# Patient Record
Sex: MALE | Race: OTHER | Employment: OTHER | URBAN - METROPOLITAN AREA
[De-identification: names, ages, dates, MRNs, and addresses within clinical notes are randomized per-mention and may not be internally consistent; named-entity substitution may affect disease eponyms.]

---

## 2019-10-29 ENCOUNTER — HOSPITAL ENCOUNTER (INPATIENT)
Facility: HOSPITAL | Age: 79
LOS: 9 days | Discharge: HOME HEALTH | DRG: 233 | End: 2019-11-08
Attending: EMERGENCY MEDICINE | Admitting: HOSPITALIST
Payer: MEDICARE

## 2019-10-29 ENCOUNTER — APPOINTMENT (OUTPATIENT)
Dept: CT IMAGING | Facility: HOSPITAL | Age: 79
DRG: 233 | End: 2019-10-29
Attending: EMERGENCY MEDICINE
Payer: MEDICARE

## 2019-10-29 DIAGNOSIS — D72.829 LEUKOCYTOSIS, UNSPECIFIED TYPE: ICD-10-CM

## 2019-10-29 DIAGNOSIS — R10.9 ABDOMINAL PAIN OF UNKNOWN ETIOLOGY: ICD-10-CM

## 2019-10-29 DIAGNOSIS — N13.30 HYDRONEPHROSIS OF RIGHT KIDNEY: ICD-10-CM

## 2019-10-29 DIAGNOSIS — R77.8 ELEVATED TROPONIN: ICD-10-CM

## 2019-10-29 DIAGNOSIS — I50.9 ACUTE ON CHRONIC CONGESTIVE HEART FAILURE, UNSPECIFIED HEART FAILURE TYPE (HCC): Primary | ICD-10-CM

## 2019-10-29 DIAGNOSIS — J18.9 COMMUNITY ACQUIRED PNEUMONIA, UNSPECIFIED LATERALITY: ICD-10-CM

## 2019-10-29 DIAGNOSIS — G47.33 OSA (OBSTRUCTIVE SLEEP APNEA): ICD-10-CM

## 2019-10-29 DIAGNOSIS — R94.31 ABNORMAL EKG: ICD-10-CM

## 2019-10-29 DIAGNOSIS — R07.9 CHEST PAIN, UNSPECIFIED TYPE: ICD-10-CM

## 2019-10-29 PROCEDURE — 74176 CT ABD & PELVIS W/O CONTRAST: CPT | Performed by: EMERGENCY MEDICINE

## 2019-10-29 RX ORDER — SODIUM CHLORIDE 9 MG/ML
1000 INJECTION, SOLUTION INTRAVENOUS ONCE
Status: DISCONTINUED | OUTPATIENT
Start: 2019-10-29 | End: 2019-10-29

## 2019-10-29 RX ORDER — PANTOPRAZOLE SODIUM 40 MG/1
40 TABLET, DELAYED RELEASE ORAL
COMMUNITY
End: 2019-11-13 | Stop reason: CLARIF

## 2019-10-29 RX ORDER — MORPHINE SULFATE 4 MG/ML
4 INJECTION, SOLUTION INTRAMUSCULAR; INTRAVENOUS ONCE
Status: COMPLETED | OUTPATIENT
Start: 2019-10-29 | End: 2019-10-29

## 2019-10-29 RX ORDER — ONDANSETRON 2 MG/ML
4 INJECTION INTRAMUSCULAR; INTRAVENOUS ONCE
Status: COMPLETED | OUTPATIENT
Start: 2019-10-29 | End: 2019-10-29

## 2019-10-30 ENCOUNTER — APPOINTMENT (OUTPATIENT)
Dept: CT IMAGING | Facility: HOSPITAL | Age: 79
DRG: 233 | End: 2019-10-30
Attending: EMERGENCY MEDICINE
Payer: MEDICARE

## 2019-10-30 ENCOUNTER — APPOINTMENT (OUTPATIENT)
Dept: INTERVENTIONAL RADIOLOGY/VASCULAR | Facility: HOSPITAL | Age: 79
DRG: 233 | End: 2019-10-30
Attending: INTERNAL MEDICINE
Payer: MEDICARE

## 2019-10-30 ENCOUNTER — APPOINTMENT (OUTPATIENT)
Dept: CV DIAGNOSTICS | Facility: HOSPITAL | Age: 79
DRG: 233 | End: 2019-10-30
Attending: INTERNAL MEDICINE
Payer: MEDICARE

## 2019-10-30 PROBLEM — I50.9 ACUTE ON CHRONIC CONGESTIVE HEART FAILURE, UNSPECIFIED HEART FAILURE TYPE (HCC): Status: ACTIVE | Noted: 2019-10-30

## 2019-10-30 PROBLEM — J18.9 COMMUNITY ACQUIRED PNEUMONIA, UNSPECIFIED LATERALITY: Status: ACTIVE | Noted: 2019-10-30

## 2019-10-30 PROBLEM — N13.30 HYDRONEPHROSIS OF RIGHT KIDNEY: Status: ACTIVE | Noted: 2019-10-30

## 2019-10-30 PROBLEM — R77.8 ELEVATED TROPONIN: Status: ACTIVE | Noted: 2019-10-30

## 2019-10-30 PROBLEM — D72.829 LEUKOCYTOSIS, UNSPECIFIED TYPE: Status: ACTIVE | Noted: 2019-10-30

## 2019-10-30 PROBLEM — R10.9 ABDOMINAL PAIN OF UNKNOWN ETIOLOGY: Status: ACTIVE | Noted: 2019-10-30

## 2019-10-30 PROBLEM — R79.89 ELEVATED TROPONIN: Status: ACTIVE | Noted: 2019-10-30

## 2019-10-30 PROBLEM — R94.31 ABNORMAL EKG: Status: ACTIVE | Noted: 2019-10-30

## 2019-10-30 PROBLEM — I50.9 ACUTE ON CHRONIC CONGESTIVE HEART FAILURE (HCC): Status: ACTIVE | Noted: 2019-10-30

## 2019-10-30 PROCEDURE — 99223 1ST HOSP IP/OBS HIGH 75: CPT | Performed by: INTERNAL MEDICINE

## 2019-10-30 PROCEDURE — 71275 CT ANGIOGRAPHY CHEST: CPT | Performed by: EMERGENCY MEDICINE

## 2019-10-30 PROCEDURE — 93306 TTE W/DOPPLER COMPLETE: CPT | Performed by: INTERNAL MEDICINE

## 2019-10-30 PROCEDURE — 74177 CT ABD & PELVIS W/CONTRAST: CPT | Performed by: EMERGENCY MEDICINE

## 2019-10-30 PROCEDURE — 99233 SBSQ HOSP IP/OBS HIGH 50: CPT | Performed by: INTERNAL MEDICINE

## 2019-10-30 PROCEDURE — 93460 R&L HRT ART/VENTRICLE ANGIO: CPT | Performed by: INTERNAL MEDICINE

## 2019-10-30 PROCEDURE — 99152 MOD SED SAME PHYS/QHP 5/>YRS: CPT | Performed by: INTERNAL MEDICINE

## 2019-10-30 PROCEDURE — B2111ZZ FLUOROSCOPY OF MULTIPLE CORONARY ARTERIES USING LOW OSMOLAR CONTRAST: ICD-10-PCS | Performed by: INTERNAL MEDICINE

## 2019-10-30 PROCEDURE — B2161ZZ FLUOROSCOPY OF RIGHT AND LEFT HEART USING LOW OSMOLAR CONTRAST: ICD-10-PCS | Performed by: INTERNAL MEDICINE

## 2019-10-30 PROCEDURE — 4A023N8 MEASUREMENT OF CARDIAC SAMPLING AND PRESSURE, BILATERAL, PERCUTANEOUS APPROACH: ICD-10-PCS | Performed by: INTERNAL MEDICINE

## 2019-10-30 RX ORDER — LISINOPRIL 10 MG/1
10 TABLET ORAL ONCE
Status: COMPLETED | OUTPATIENT
Start: 2019-10-30 | End: 2019-10-30

## 2019-10-30 RX ORDER — SODIUM CHLORIDE 9 MG/ML
INJECTION, SOLUTION INTRAVENOUS CONTINUOUS
Status: ACTIVE | OUTPATIENT
Start: 2019-10-30 | End: 2019-10-30

## 2019-10-30 RX ORDER — LISINOPRIL 10 MG/1
10 TABLET ORAL 2 TIMES DAILY
Status: DISCONTINUED | OUTPATIENT
Start: 2019-10-30 | End: 2019-11-04

## 2019-10-30 RX ORDER — POTASSIUM CHLORIDE 20 MEQ/1
40 TABLET, EXTENDED RELEASE ORAL EVERY 4 HOURS
Status: DISPENSED | OUTPATIENT
Start: 2019-10-30 | End: 2019-10-30

## 2019-10-30 RX ORDER — ACETAMINOPHEN 325 MG/1
650 TABLET ORAL EVERY 4 HOURS PRN
Status: DISCONTINUED | OUTPATIENT
Start: 2019-10-30 | End: 2019-11-04

## 2019-10-30 RX ORDER — NICOTINE 21 MG/24HR
1 PATCH, TRANSDERMAL 24 HOURS TRANSDERMAL DAILY
Status: DISCONTINUED | OUTPATIENT
Start: 2019-10-30 | End: 2019-11-08

## 2019-10-30 RX ORDER — HEPARIN SODIUM AND DEXTROSE 10000; 5 [USP'U]/100ML; G/100ML
12 INJECTION INTRAVENOUS ONCE
Status: COMPLETED | OUTPATIENT
Start: 2019-10-30 | End: 2019-10-30

## 2019-10-30 RX ORDER — FUROSEMIDE 10 MG/ML
40 INJECTION INTRAMUSCULAR; INTRAVENOUS
Status: DISCONTINUED | OUTPATIENT
Start: 2019-10-30 | End: 2019-10-30

## 2019-10-30 RX ORDER — ASPIRIN 81 MG/1
324 TABLET, CHEWABLE ORAL ONCE
Status: COMPLETED | OUTPATIENT
Start: 2019-10-30 | End: 2019-10-30

## 2019-10-30 RX ORDER — LIDOCAINE HYDROCHLORIDE 10 MG/ML
INJECTION, SOLUTION EPIDURAL; INFILTRATION; INTRACAUDAL; PERINEURAL
Status: COMPLETED
Start: 2019-10-30 | End: 2019-10-30

## 2019-10-30 RX ORDER — HEPARIN SODIUM 5000 [USP'U]/ML
30 INJECTION INTRAVENOUS; SUBCUTANEOUS ONCE
Status: DISCONTINUED | OUTPATIENT
Start: 2019-10-30 | End: 2019-11-01

## 2019-10-30 RX ORDER — HEPARIN SODIUM 5000 [USP'U]/ML
60 INJECTION INTRAVENOUS; SUBCUTANEOUS ONCE
Status: COMPLETED | OUTPATIENT
Start: 2019-10-30 | End: 2019-10-30

## 2019-10-30 RX ORDER — MIDAZOLAM HYDROCHLORIDE 1 MG/ML
INJECTION INTRAMUSCULAR; INTRAVENOUS
Status: COMPLETED
Start: 2019-10-30 | End: 2019-10-30

## 2019-10-30 RX ORDER — FUROSEMIDE 10 MG/ML
40 INJECTION INTRAMUSCULAR; INTRAVENOUS ONCE
Status: COMPLETED | OUTPATIENT
Start: 2019-10-30 | End: 2019-10-30

## 2019-10-30 RX ORDER — HEPARIN SODIUM AND DEXTROSE 10000; 5 [USP'U]/100ML; G/100ML
INJECTION INTRAVENOUS CONTINUOUS
Status: DISCONTINUED | OUTPATIENT
Start: 2019-10-30 | End: 2019-11-01

## 2019-10-30 RX ORDER — ASPIRIN 81 MG/1
324 TABLET, CHEWABLE ORAL NIGHTLY
Status: DISCONTINUED | OUTPATIENT
Start: 2019-10-30 | End: 2019-11-04

## 2019-10-30 RX ORDER — ACETAMINOPHEN AND CODEINE PHOSPHATE 300; 30 MG/1; MG/1
1 TABLET ORAL EVERY 4 HOURS PRN
Status: DISCONTINUED | OUTPATIENT
Start: 2019-10-30 | End: 2019-11-04

## 2019-10-30 RX ORDER — ACETAMINOPHEN AND CODEINE PHOSPHATE 300; 30 MG/1; MG/1
2 TABLET ORAL EVERY 4 HOURS PRN
Status: DISCONTINUED | OUTPATIENT
Start: 2019-10-30 | End: 2019-11-04

## 2019-10-30 RX ORDER — HEPARIN SODIUM 5000 [USP'U]/ML
INJECTION, SOLUTION INTRAVENOUS; SUBCUTANEOUS
Status: COMPLETED
Start: 2019-10-30 | End: 2019-10-30

## 2019-10-30 RX ORDER — NITROGLYCERIN 20 MG/100ML
INJECTION INTRAVENOUS CONTINUOUS
Status: DISCONTINUED | OUTPATIENT
Start: 2019-10-30 | End: 2019-10-31

## 2019-10-30 RX ORDER — HYDRALAZINE HYDROCHLORIDE 20 MG/ML
10 INJECTION INTRAMUSCULAR; INTRAVENOUS
Status: DISCONTINUED | OUTPATIENT
Start: 2019-10-30 | End: 2019-11-08

## 2019-10-30 RX ORDER — ATORVASTATIN CALCIUM 40 MG/1
40 TABLET, FILM COATED ORAL NIGHTLY
Status: DISCONTINUED | OUTPATIENT
Start: 2019-10-30 | End: 2019-11-08

## 2019-10-30 NOTE — CONSULTS
Cardiothoracic Surgery  Consult       Referring: Dr Prudenico Camacho  PCP: Dr Kassy Mantilla  Reason for consult:  70year old admitted with abdominal pain, had recent EGD demo gastritis and small hiatal hernia,   also had recent SOB and chest tightness  Pt admits to hav inferior, and basal-mid anterolateral myocardium. 3. Aortic valve: Trileaflet; normal thickness leaflets. Transvalvular     velocity was within the normal range. There was no stenosis. Trivial     regurgitation.   4. Aorta: Ascending aorta diameter was dil neck or groin  Lungs: Clear bilaterally, normal respiratory effort                                            CV: RRR   Abdomen: soft, non-tender, non-distended, no hepatomegaly or splenomegaly  Ext: no edema, no venous varicosities  Skin: no erythema, umair represent infarct or   infection such as pyelonephritis. This also limits sensitivity for an underlying lesion. A hypodense lesion of the left hepatic lobe measures 9 mm in diameter and is incompletely characterized.   It is likely too small for accurate family and questions answered   Plan: ck carotids, pulmonary and ID consult, may need  consult, CABG on these issues resolved

## 2019-10-30 NOTE — H&P
[facility] HOSPITAL[facility]  History and Physical     Formerly [facility] Patient Status:  Inpatient    1940 MRN [id number]   [facility] 2NE-A Attending Inocente Homans, MD   Hosp Day # 0 PCP PHYSICIAN NONSTAFF     Chief Complaint: Abdominal Pain No rhonchi. Cardiovascular: S1, S2. Regular rate and rhythm. No murmurs, rubs or gallops. Equal pulses. Chest and Back: No tenderness or deformity. Abdomen: Soft, mild epigastric tenderness, nondistended. Positive bowel sounds.  No rebound, guarding or after discharge, patient will require TBD.

## 2019-10-30 NOTE — ED INITIAL ASSESSMENT (HPI)
Pt reports abdominal pain and vomiting starting this morning after drinking coffee. Unable to tolerate the pain tonight. Phone  used in triage.

## 2019-10-30 NOTE — CONSULTS
[facility]  Report of Consultation    Robert Sheppard Patient Status:  Inpatient    1940 MRN [id number]   [facility] 2NE-A Attending Sybil Chung MD   Hosp Day # 0 PCP PHYSICIAN NONSTAFF     Reason for Consultation: Abnormal questioning, the patient does have some issues with urinary hesitancy and frequency but denies hematuria or dysuria. Of note, the patient was markedly hypertensive in the emergency room but is never been diagnosed with hypertension.   Earlier today he unde Conjunctivae/corneas clear. No scleral icterus. No conjunctival     hemorrhage. Nose: Nares normal.   Throat: Lips, mucosa, and tongue normal.  No thrush noted. Neck: Soft, supple neck; trachea midline, no adenopathy, no thyromegaly.    Lungs: Clear a regurgitation. 4. Aorta: Ascending aorta diameter was dilated at 4.2cm. Aortic root was     dilated at 4.3cm at the sinus of Valsalva. 5. Mitral valve: Structurally normal valve. Mitral valve demonstrates normal     thickness leaflets.  Transvalvular velo

## 2019-10-30 NOTE — ED PROVIDER NOTES
Patient Seen in: [facility] Emergency Department      History   Patient presents with:  Abdomen/Flank Pain (GI/)    Stated Complaint: abd pain    HPI    74-year-old male presents emergency room with chief complaint of abdominal pain as well as david Current:BP (!) 178/87   Pulse 63   Temp 99.3 °F (37.4 °C) (Oral)   Resp 22   Wt 72.6 kg   SpO2 97%         Physical Exam    GENERAL: Patient is awake, alert, well-appearing, in no acute distress. HEENT: no scleral icterus.   Mucous membranes are mois Status                     ---------                               -----------         ------                     CBC W/ DIFFERENTIAL[[id number]]          Abnormal            Final result                 Please view results for these tests on the individual pelvis:  Heterogeneous enhancement of the right kidney with multifocal areas of decreased enhancement, concerning for either pyelonephritis or multiple infarcts. 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   No significant jocelyn Urinalysis not consistent with pyelonephritis and clinical exam not consistent as patient has no CVA tenderness. Etiology of the decreased enhancement of kidneys is unclear.   Patient did have blood cultures performed and was given IV antibiotic for pneumo

## 2019-10-30 NOTE — PLAN OF CARE
Patient resting comfortably in bed, wife and son at bedside. Burundian speaking only. Aox4. Oxygenating 91% on 2L NC. NSR/Sinus alfred on tele. VSS. Nitro gtt at 10 mcgs/min, Heparin gtt a 9 mls/hr.  Complains of some SOB at rest and pain mild abd pain rated 2 measures for life threatening arrhythmias  - Monitor electrolytes and administer replacement therapy as ordered  Outcome: Progressing

## 2019-10-30 NOTE — PROCEDURES
Procedure dictated.     RHC  Saturations  C  LV-gram    Indications:  -Hypertensive crisis with presenting blood pressure up to 200 mmHg  -Chest heaviness and shortness of breath over last few weeks  -Abnormal EKG  -Elevated troponin at 0.117  -Active smo medications  -Right leg straight for 4 hours  -DC IV Lasix -mild CHF was tuneup based on right heart catheterization today  -Hypoxia with history of active smoking with no official diagnosis of COPD  -Recommend outpatient sleep apnea study based on tee

## 2019-10-30 NOTE — PLAN OF CARE
10/30/2019    Pt A&Ox4, 2L NC- no home O2. Lungs congested at the bases. NSR on tele. Macedonian speaking. Admission process complete. Answered all questions and met all pt needs. Son and wife at bedside. Pt continent with urinal at bedside.  Resting at this t

## 2019-10-31 ENCOUNTER — APPOINTMENT (OUTPATIENT)
Dept: GENERAL RADIOLOGY | Facility: HOSPITAL | Age: 79
DRG: 233 | End: 2019-10-31
Attending: INTERNAL MEDICINE
Payer: MEDICARE

## 2019-10-31 PROCEDURE — 99233 SBSQ HOSP IP/OBS HIGH 50: CPT | Performed by: INTERNAL MEDICINE

## 2019-10-31 PROCEDURE — 99232 SBSQ HOSP IP/OBS MODERATE 35: CPT | Performed by: HOSPITALIST

## 2019-10-31 PROCEDURE — 71048 X-RAY EXAM CHEST 4+ VIEWS: CPT | Performed by: INTERNAL MEDICINE

## 2019-10-31 NOTE — PROGRESS NOTES
Pt. S/P heart cath , right groin soft , denies any pain or discomfort. Post procedura instructions provided. Pt. Austrian speaking only , family at bedside to interpret. Tele shows SR on the monitor. On O2 at 2 li/min via nc m O2 sat at > 90's.  Jalen De Anda

## 2019-10-31 NOTE — CONSULTS
[facility] Urology   Consultation Note    Beltran Woodard Patient Status:  Inpatient    1940 MRN [id number]   [place] 2NE-A Attending Shima Bates MD   Hosp Day # 1 PCP PHYSICIAN NONSTAFF     Reason for Consultation: infusion 50mg in D5W 250ml, 5-400 mcg/min, Intravenous, Continuous  •  CefTRIAXone Sodium (ROCEPHIN) 1 g in sodium chloride 0.9% 100 mL MBP/ADD-vantage, 1 g, Intravenous, Q24H  •  azithromycin (ZITHROMAX) 500 mg in sodium chloride 0.9% 250 mL IVPB, 500 mg,  10/31/2019    CO2 27.0 10/31/2019     10/31/2019    CA 8.6 10/31/2019    PTT 44.6 10/31/2019    ESRML 45 10/31/2019    CRP 11.80 10/31/2019    MG 2.1 10/31/2019    PGLU 144 10/31/2019     WBC 14.3-->14.8-->17.4    UA 10/30/19: 5-10 WBC/hpf pulmonary embolism. Heart size is within normal limits.   Small to moderate right and small left pleural effusions appear slightly increased from the   prior exam.     Central airways demonstrate patchy areas of airspace opacity concerning for multifocal p multifocal pneumonia. Edema is possible but felt to be less likely. There is peribronchial thickening as well. Follow-up is recommended to ensure resolution. Limited evaluation for pulmonary embolism, however no pulmonary embolism is seen.      Media AM

## 2019-10-31 NOTE — CONSULTS
INFECTIOUS DISEASE CONSULT NOTE    Oralee Winnsboro Patient Status:  Inpatient    1940 MRN [id number]   [facility] 2NE-A Attending Jayla Henriquez MD   Hosp Day # 1 PCP PHYSICIAN drugs.     Allergies:  No Known Allergies    Medications:    Current Facility-Administered Medications:   •  nitroGLYCERIN infusion 50mg in D5W 250ml, 5-400 mcg/min, Intravenous, Continuous  •  CefTRIAXone Sodium (ROCEPHIN) 1 g in sodium chloride 0.9% 100 m arthritis. Neurological: Negative for headaches, dizziness, focal neuro deficits  Behavioral/Psych: Negative for anxiety or depression    Physical Exam:    General: No acute distress. Alert and oriented x 3.   Vital signs: Blood pressure 129/54, pulse 59, Oral)(cpt=74176)    Result Date: 10/29/2019  PROCEDURE:  CT ABDOMEN/PELVIS KIDNEYSTONE 2D RNDR (NO IV,NO ORAL) (CPT=74176)  COMPARISON:  None.   INDICATIONS:  abd pain  TECHNIQUE:  Unenhanced multislice CT scanning from above the kidneys to below the urinar system may be due to mild chronic right UPJ obstruction.     Dictated by: Fatimah Rosen MD on 10/29/2019 at 23:20     Approved by: Fatimah Rosen MD on 10/29/2019 at 23:25          Cta Chest + Ct Abd (w) + Ct Pel (w) (cpt=71275/32374)    Result D suggested. Incidental note is made of aneurysmal dilatation of the mid ascending thoracic aorta measuring up to 4.2 cm in diameter. ABDOMEN/PELVIS:  Motion artifact also slightly limits evaluation of the abdomen and pelvis.   Liver contains a tiny hypoden provided by [facility].       Dictated by: Homer Hoffman MD on 10/30/2019 at 7:25     Approved by: Homer Hoffman MD on 10/30/2019 at 7:34           ASSESSMENT/ PLAN:    1. abnl CXR with bilat infiltrates- chronic presentation, symptoms going on for over 1 m

## 2019-10-31 NOTE — PROGRESS NOTES
[facility]ITAL[facility]  Progress Note     Robert Sheppard Patient Status:  Inpatient    1940 MRN [id number]   [facility] 2NE-A Attending Sybil Chung MD   Hosp Day # 1 PCP PHYSICIAN NONSTAFF     Chief Complaint: abd pain, SOB  CAD fo 1. New onset Acute CHF  Systolic   1. ECHO  LVEF 40-45%  + WMA   2. Diuresis  3. I/O's  4. Cardiology following  2. CAD -  S/p LHC  1. CAD multi vessel  RCA/ Cx   2. CVS consult  Poss CABG Mon if pulm issues resolve   3. Heparin gtt  4.  CABG vs  pl MD [facility]  Internal Medicine Hospitalist  Pager [phone]  Cell [phone]

## 2019-10-31 NOTE — DIETARY NOTE
[facility]    NUTRITION INITIAL ASSESSMENT    Pt does not meet malnutrition criteria.     NUTRITION DIAGNOSIS/PROBLEM:    Possible food and nutrition-related knowledge deficit related to possible lack of previous diet ed as evidenced by consult for HF Addresses: Yes    NUTRITION RELATED PHYSICAL FINDINGS: unable to complete, pt not in room.     NUTRITION PRESCRIPTION:  Calories: 2544-6924 calories/day (23-25 calories per kg)  Protein: 72-90 grams protein/day (1.0-1.25 grams protein per kg)  Fluid: ~1 ml/

## 2019-10-31 NOTE — PLAN OF CARE
Dr. Mikey Mccoy to  see patient; ct abdomen result of right hydronephrosis  Accurate I/o  Info re iv antibiotics given to patient and printed materials re pneumonia

## 2019-10-31 NOTE — PROGRESS NOTES
[facility] Cardiology  Progress Note    Florencia Floyd Patient Status:  Inpatient    1940 MRN [id number]   [facility] 2NE-A Attending Charis Galindo MD   Hosp Day # 1 PCP PHYSICIAN NONSTAFF     Subjective: Chest sympto skin. No rashes. Psychiatric: no depression. Normal affect.      Laboratory/Data:    Labs:  Lab Results   Component Value Date    ESRML 45 10/31/2019    CRP 11.80 10/31/2019       Recent Labs   Lab 10/29/19  2212 10/30/19  0609 10/31/19  0740   WBC 14.3* 1 enlargement or focal lesion. AORTA/VASCULAR:  Vascular calcifications are noted. RETROPERITONEUM:  No mass or adenopathy. BOWEL/MESENTERY:  The appendix is normal. ABDOMINAL WALL:  No mass or hernia.   BONES:  Bilateral pars interarticularis defects with CONTRAST USED:  100cc of Omnipaque 350  FINDINGS:   CHEST:  Motion artifact somewhat limits evaluation. Within the limitations there is no convincing evidence of pulmonary embolism. Heart size is within normal limits.   Small to moderate right and small l decompressed. No lymphadenopathy is seen. CONCLUSION:  Patchy airspace opacities of the lungs favored represent multifocal pneumonia. Edema is possible but felt to be less likely. There is peribronchial thickening as well.   Follow-up is recommende PRN    Or  Acetaminophen-Codeine #3 (TYLENOL #3) 300-30 MG tab 2 tablet, 2 tablet, Oral, Q4H PRN  nicotine (NICODERM CQ) 14 MG/24HR 1 patch, 1 patch, Transdermal, Daily        Allergies:  No Known Allergies    Impression:  1.      Hypertensive crisis with p protein 11.8. Leukocytosis with left shift and neutrophils. Kidney infection not excluded. 8.    Right flank pain -mild right hydronephrosis possible due to right UPJ obstruction.   Right kidney wedged shaped areas of hypoattenuation throughout the par

## 2019-10-31 NOTE — PROGRESS NOTES
[facility]  Progress Note    Rosi Diaz Patient Status:  Inpatient    1940 MRN [id number]   [facility] 2NE-A Attending Lena Irene MD   Hosp Day # 1 PCP PHYSICIAN NONSTAFF     Subjective:  Rosi Diaz is a 70 year o 24.0 28.0  --  27.0   ALKPHO 109  --   --   --    AST 62*  --   --   --    ALT 52  --   --   --    BILT 0.7  --   --   --    TP 8.0  --   --   --        Recent Labs   Lab 10/31/19  0740   MG 2.1       No results found for: PHOS, PHOSPHORUS     Recent Labs

## 2019-10-31 NOTE — PLAN OF CARE
Problem: Patient/Family Goals  Goal: Patient/Family Long Term Goal  Description  Patient's Long Term Goal: to stay out of the hospital    Interventions:  - take medications as prescribed  -adhere to plan of care  -adhere to appropriate dietary restrictio

## 2019-10-31 NOTE — CONSULTS
All pertinent imaging, labs, notes and history reviewed by Dr. Frank Powers. Although we agree that pulmonary findings are more consistent with CHF, the continued leukocytosis is concerning and should be resolved prior to surgery.  We will follow over the weekend an

## 2019-10-31 NOTE — PROCEDURES
[facility]    PATIENT'S NAME: Nikita Shankar   ATTENDING PHYSICIAN: Rizwan Guevara M.D. OPERATING PHYSICIAN: Lit Campoverde M.D.    PATIENT ACCOUNT#:   [de-identified]    LOCATION:  [address] [place] [address]  MEDICAL RECORD #:   [id number]       DATE OF REMBERTO catheterization was performed using pulmonary capillary wedge pressure catheter. We measured right heart pressures and calculated cardiac output using a Jaqui equation on room air.     Selective coronary angiography was performed using 6-Korean FR4 and FL4 no disease angiographically. LAD artery had mild disease in proximal segment; otherwise, no stenosis angiographically although a very tortuous vessel due to history of uncontrolled hypertension.   Major diagonal branches had no disease angiographically but segments. 9. No MR angiographically. 10.   Mildly to moderately dilated ascending aorta with mildly dilated aortic root.   11.   Right heart catheterization was consistent with mild pulmonary hypertension and normal biventricular filling pressures after

## 2019-10-31 NOTE — PAYOR COMM NOTE
--------------  ADMISSION REVIEW     Payor: [id number] [place] [address] #:  [id number]  Authorization Number: [id number]    Admit date: 10/30/19  Admit time: 700 Kevin       Admitting Physician: Scarlett Carty MD  Attending Physician:  Birgit Regalado MD [employer]                      Social History    Tobacco Use      Smoking status: Current Every Day Smoker        Packs/day: 0.00      Smokeless tobacco: Never Used    Alcohol use: Not on file    Drug use: Not on file             Review of Systems Notable for the following components:    Pro-Beta Natriuretic Peptide 6,623 (*)     All other components within normal limits   CBC W/ DIFFERENTIAL - Abnormal; Notable for the following components:    WBC 14.3 (*)     Neutrophil Absolute Prelim 11.13 (*) heart.  Numerous mildly prominent mediastinal and hilar lymph nodes  Degenerative changes of the spine    Abdomen and pelvis:  Heterogeneous enhancement of the right kidney with multifocal areas of decreased enhancement, concerning for either pyelonephriti the right kidney with multifocal areas of decreased enhancement concerning for pyelonephritis or multiple infarcts. Urinalysis not consistent with pyelonephritis and clinical exam not consistent as patient has no CVA tenderness.   Etiology of the decreased HOSPITALIST  History and Physical     Macy Camacho Patient Status:  Inpatient    1940 MRN [id number]   [facility] 2NE-A Attending Dereje Ugalde MD   Hosp Day # 0 PCP PHYSICIAN NONSTAFF     Chief Complaint: Abdominal Pain    Histor 9. 2   ALB 3.5      K 3.8      CO2 24.0   ALKPHO 109   AST 62*   ALT 52   BILT 0.7   TP 8.0       CrCl cannot be calculated (Unknown ideal weight. ).     Recent Labs   Lab 10/29/19  2212   PTP 13.3   INR 0.97       Recent Labs   Lab 10/29/19  2212 2334 New Bag 500 mg Intravenous Romeleigh anne Mary Lou PARRISH RN      CefTRIAXone Sodium (ROCEPHIN) 1 g in sodium chloride 0.9% 100 mL MBP/ADD-vantage     Date Action Dose Route User    10/30/2019 2332 New Bag 1 g Intravenous Kadi PARRISH RN      heparin (PORCINE Date Action Dose Route User    10/30/2019 1643 New Bag (none) Intravenous Jass Chávez RN      Reason for Consultation: Abnormal chest x-ray, significant coronary artery disease/congestive heart failure, concern for possible pneumonia     History of Pre patient was markedly hypertensive in the emergency room but is never been diagnosed with hypertension.   Earlier today he underwent coronary angiography to investigate his elevated troponin levels and an abnormal echocardiogram that demonstrated a decreased No conjunctival                    hemorrhage. Nose: Nares normal.               Throat: Lips, mucosa, and tongue normal.  No thrush noted. Neck: Soft, supple neck; trachea midline, no adenopathy, no thyromegaly.                L thickness leaflets. Transvalvular     velocity was within the normal range. There was no stenosis. Trivial     regurgitation. 4. Aorta: Ascending aorta diameter was dilated at 4.2cm. Aortic root was     dilated at 4.3cm at the sinus of Valsalva.   [address] lb 15.2 oz None (Room air) — BM   10/30/19 2326 99.8 °F (37.7 °C) 69 18 127/64 89 %Abnormal  — None (Room air) — GC   10/30/19 1957 98.4 °F (36.9 °C) 71 17 129/65 94 % — Nasal cannula 2 L/min RW   10/30/19 1900 — 81 — 146/72 — — — — KD   10/30/19 1802 — 82

## 2019-10-31 NOTE — PLAN OF CARE
Sinus rhythm on tele deny chest pain heparin drip infusing / acs afib protocol  Right groin soft cms is normal    02 sat on room air at rest is 88%    Needs 2-3L 02/ nc to keep o2 sat over 92%    Iv antibiotics wean o2 as tolerated  Increase activity as

## 2019-11-01 PROCEDURE — 99232 SBSQ HOSP IP/OBS MODERATE 35: CPT | Performed by: HOSPITALIST

## 2019-11-01 PROCEDURE — 99233 SBSQ HOSP IP/OBS HIGH 50: CPT | Performed by: INTERNAL MEDICINE

## 2019-11-01 RX ORDER — MORPHINE SULFATE 4 MG/ML
1 INJECTION, SOLUTION INTRAMUSCULAR; INTRAVENOUS EVERY 4 HOURS PRN
Status: DISCONTINUED | OUTPATIENT
Start: 2019-11-01 | End: 2019-11-04

## 2019-11-01 RX ORDER — METOPROLOL TARTRATE 5 MG/5ML
5 INJECTION INTRAVENOUS ONCE
Status: DISCONTINUED | OUTPATIENT
Start: 2019-11-01 | End: 2019-11-01

## 2019-11-01 RX ORDER — FUROSEMIDE 20 MG/1
20 TABLET ORAL DAILY
Status: DISCONTINUED | OUTPATIENT
Start: 2019-11-01 | End: 2019-11-04

## 2019-11-01 RX ORDER — METOPROLOL TARTRATE 5 MG/5ML
2.5 INJECTION INTRAVENOUS ONCE
Status: COMPLETED | OUTPATIENT
Start: 2019-11-01 | End: 2019-11-01

## 2019-11-01 NOTE — PROGRESS NOTES
[facility]PITAL[facility]  Progress Note     Chyrl Ambrosia Patient Status:  Inpatient    1940 MRN [id number]   [facility] 2NE-A Attending Keisha Alvarez MD   Hosp Day # 2 PCP PHYSICIAN NONSTAFF     Chief Complaint: abd pain, SOB  CAD fo 11/01/19  0556   TROP 0.066* 0.117* <0.045        Imaging: Imaging data reviewed in Epic. ASSESSMENT / PLAN:   1. New onset Acute CHF  Systolic   1. ECHO  LVEF 40-45%  + WMA   2. Diuresis as needed per cards   3. I/O's  4. Cardiology following  2.  CAD - breathing pattern  Abdomen: nontender; nondistended  Neuro: nonfocal; moves all limbs; cnii-xii grossly intact     Assessment/Plan:  I have had a face-to-face encounter with this patient and agree with eduar faria's assessment and plan for this patient.

## 2019-11-01 NOTE — PROGRESS NOTES
[facility] Cardiology Progress Note    Subjective:  Resting comfortably without any pain per family at bedside. Per nursing, he declined to walk in hallway, because he feels he is too sick.      Objective:  /50 (BP Location: Left arm)   Pulse 58   Temp chart was reviewed. Hypertensive crisis with presenting blood pressure up to 200 mmHg causing no acute CHF with underlying coronary artery disease -no new diagnoses - hypertension is a new diagnosis.   We initiated two blood pressure medications post car Multivessel coronary artery disease with 100% chronic total occlusion of proximal RCA and chronic total occlusion of proximal left circumflex system. The entire heart is supplied by one vessel LAD artery with no significant disease angiographically.   LAD use Lasix 20 mg daily since he was not fluid overloaded by recent RHC. I like to start low-dose of Lasix for pleural effusion to try to diuresis it. No fluid overload by exam.  Continue initiated aspirin, beta-blocker, lisinopril and statin.   Okay to DC

## 2019-11-01 NOTE — CM/SW NOTE
MSW spoke with [place] from [facility]. They are unable to accept this patient but will re-refer to [facility]. Order and face to face completed.      Lieutenant Elda LCSW

## 2019-11-01 NOTE — PLAN OF CARE
Assumed care of pt. At 1. Pt. Is AxOx4 and on 2 L's of O2, with O2 sat of 94%. Pt. Is Mohawk speaking only. Family at bedside and  ipad in room. Pt. Has diminished bilateral breath sounds. Pt. Has NSR/ sinus alfred with a current HR of 59.  Pt weights  Outcome: Progressing  Goal: Absence of cardiac arrhythmias or at baseline  Description  INTERVENTIONS:  - Continuous cardiac monitoring, monitor vital signs, obtain 12 lead EKG if indicated  - Evaluate effectiveness of antiarrhythmic and heart rat

## 2019-11-01 NOTE — PROGRESS NOTES
[facility]  Progress Note    Chyrl Ambrosia Patient Status:  Inpatient    1940 MRN [id number]   [facility] 2NE-A Attending Keisha Alvarez MD   [facility] Day # 2 PCP PHYSICIAN NONSTAFF     Subjective:  Chyrl Ambrosia is a(n) 36 yea --   --    PTT 27.2   < > 47.3* 52.6* 61.9*    < > = values in this interval not displayed. No results for input(s): ABGPHT, HKFTCM6K, YZCCR3M, ABGHCO3, ABGBE, TEMP, SHANAE, SITE, DEV, THGB in the last 168 hours.     Invalid input(s): [id number], CHOB related to volume overload given bilateral nature and initial presentation  · Monitor off abx, cultures NGTD. Seen by ID  · CABG- timing TBD. Remains on heparin gtt given recurrent CP. Likely early next week.     Discussed w medicine, cardiology APN, RN

## 2019-11-01 NOTE — HOME CARE LIAISON
Received referral from [address] [place] [address] unable to accept patient at this time because [facility] is not contracted with the patient's insurance. Informed Guadalupe Hamlin and will re-refer to [facility]. Will update when patient is successfully re-referred.       6

## 2019-11-01 NOTE — PROGRESS NOTES
INFECTIOUS DISEASE PROGRESS NOTE    Evan Cove Patient Status:  Inpatient    1940 MRN [id number]   [facility] 2NE-A Attending Ronaldo Riojas MD   Hosp Day # 2 PCP PHYSICIAN crackles  Cardiovascular: S1, S2.  Regular rate and rhythm. Abdomen: Soft, nontender, nondistended. Positive bowel sounds. +R flank pain  Musculoskeletal: no arthritis.   Integument: No rash    Laboratory Data:    Recent Labs   Lab 10/29/19  2212 10/30/ [facility] , CT, CTA CHEST + CT ABD (W) + CT PEL (W) SH(CPT=71275/77945), 10/30/2019, 0:06.     INDICATIONS: Heart failure    PATIENT STATED HISTORY: (As transcribed by Technologist) Post surgery      FINDINGS:   LUNGS: Cardiac silhouette and pulmonary vasculat

## 2019-11-01 NOTE — BH PROGRESS NOTE
11; 52 Patient verbalized  Has left sided chest pain deny dyspnea deny nausea b/p=116/52  Just seen by vince castro and dr. Savannah Marie   W/ order 12 lead ekg done iv lopressor given   Dr. Lori Metzger rounding seen/examined patient

## 2019-11-02 ENCOUNTER — ANESTHESIA EVENT (OUTPATIENT)
Dept: CARDIAC SURGERY | Facility: HOSPITAL | Age: 79
DRG: 233 | End: 2019-11-02
Payer: MEDICARE

## 2019-11-02 PROCEDURE — 99232 SBSQ HOSP IP/OBS MODERATE 35: CPT | Performed by: INTERNAL MEDICINE

## 2019-11-02 PROCEDURE — 99231 SBSQ HOSP IP/OBS SF/LOW 25: CPT | Performed by: HOSPITALIST

## 2019-11-02 RX ORDER — ENOXAPARIN SODIUM 100 MG/ML
40 INJECTION SUBCUTANEOUS DAILY
Status: DISCONTINUED | OUTPATIENT
Start: 2019-11-02 | End: 2019-11-04

## 2019-11-02 RX ORDER — POTASSIUM CHLORIDE 20 MEQ/1
40 TABLET, EXTENDED RELEASE ORAL EVERY 4 HOURS
Status: COMPLETED | OUTPATIENT
Start: 2019-11-02 | End: 2019-11-02

## 2019-11-02 NOTE — PLAN OF CARE
Assumed care of pt at [address]. Pt alert and oriented x4, saturating well on RA, SR on tele. Lithuanian speaking only, family in room to interpret. Pt's hep gtt d/c today per cardiology. No CP for this shift. Resting comfortably in bed.  Encouraged to get up an EKG if indicated  - Evaluate effectiveness of antiarrhythmic and heart rate control medications as ordered  - Initiate emergency measures for life threatening arrhythmias  - Monitor electrolytes and administer replacement therapy as ordered  Outcome: Progr

## 2019-11-02 NOTE — PLAN OF CARE
Problem: Patient/Family Goals  CV Surgery on consult , proposed CABG on Monday  Goal: Patient/Family Long Term Goal  Description  Patient's Long Term Goal: to stay out of the hospital    Interventions:  - take medications as prescribed  -adhere to plan o difficulty  Denies dysuria or feeling of not emptying bladder  Goal: Absence of urinary retention  Description  INTERVENTIONS:  - Assess patient’s ability to void and empty bladder  - Monitor intake/output and perform bladder scan as needed  - Follow urina

## 2019-11-02 NOTE — PROGRESS NOTES
[facility]  Urology Progress Note    Amina Stover Patient Status:  Inpatient    1940 MRN [id number]   [facility] 2NE-A Attending Javier Marie MD   [address] Day # 3 PCP PHYSICIAN NONSTAFF     Subjective:  Amina Stover is a(n admission.      Piper Lam P.A.-C  [facility]  11/2/2019  8:58 AM

## 2019-11-02 NOTE — PROGRESS NOTES
[facility]  Progress Note     Rosario Sagastume Patient Status:  Inpatient    1940 MRN [id number]   [facility] 2NE-A Attending Lele Shea MD   Hosp Day # 3 PCP PHYSICIAN NONSTAFF     Chief Complaint: abd pain, SOB  CAD fo Epic.  ASSESSMENT / PLAN:   1. New onset Acute CHF  Systolic   1. ECHO  LVEF 40-45%  + WMA   2. cabg planned Monday? 2. CAD -  S/p LHC  1. CAD multi vessel  RCA/ Cx   2. Heparin gtt d/c'd per cards  3. Pleural effusion-per pulm; no tap at this time  4.

## 2019-11-02 NOTE — PROGRESS NOTES
[facility]  Progress Note    Kamala Ochoa Patient Status:  Inpatient    1940 MRN [id number]   [facility] 2NE-A Attending Anthony Centeno MD   Hosp Day # 3 PCP PHYSICIAN NONSTAFF     Subjective:  Kamala Ochoa is a(n) 70 yea BILT 0.7  --   --   --   --    TP 8.0  --   --   --   --        Recent Labs   Lab 10/31/19  0740   MG 2.1       No results found for: PHOS, PHOSPHORUS     Recent Labs   Lab 10/29/19  2212  10/31/19  1701 11/01/19  0556 11/02/19  0620   INR 0.97  --   -- On heparin gtt. Likely related to volume overload given bilateral nature and initial presentation  · Recheck CXR tomorrow to reassess effusion  · Monitor off abx, cultures NGTD. Seen by ID  · CABG- timing TBD.  Cleared from pulmonary standpoint for CABG

## 2019-11-03 ENCOUNTER — APPOINTMENT (OUTPATIENT)
Dept: GENERAL RADIOLOGY | Facility: HOSPITAL | Age: 79
DRG: 233 | End: 2019-11-03
Attending: INTERNAL MEDICINE
Payer: MEDICARE

## 2019-11-03 PROCEDURE — 99231 SBSQ HOSP IP/OBS SF/LOW 25: CPT | Performed by: HOSPITALIST

## 2019-11-03 PROCEDURE — 99232 SBSQ HOSP IP/OBS MODERATE 35: CPT | Performed by: INTERNAL MEDICINE

## 2019-11-03 PROCEDURE — 71045 X-RAY EXAM CHEST 1 VIEW: CPT | Performed by: INTERNAL MEDICINE

## 2019-11-03 RX ORDER — SODIUM CHLORIDE 9 MG/ML
INJECTION, SOLUTION INTRAVENOUS CONTINUOUS
Status: DISCONTINUED | OUTPATIENT
Start: 2019-11-03 | End: 2019-11-04

## 2019-11-03 RX ORDER — CEFAZOLIN SODIUM/WATER 2 G/20 ML
2 SYRINGE (ML) INTRAVENOUS
Status: DISPENSED | OUTPATIENT
Start: 2019-11-04 | End: 2019-11-04

## 2019-11-03 RX ORDER — DEXTROSE MONOHYDRATE 25 G/50ML
50 INJECTION, SOLUTION INTRAVENOUS
Status: DISCONTINUED | OUTPATIENT
Start: 2019-11-03 | End: 2019-11-04 | Stop reason: SDUPTHER

## 2019-11-03 NOTE — PROGRESS NOTES
11/03/19 1742   Clinical Encounter Type   Visited With Patient and family together   Routine Visit Introduction   Continue Visiting No   Pentecostal Encounters   Spiritual Requests During Visit / Hospitalization [address] [facility] Encounters

## 2019-11-03 NOTE — PLAN OF CARE
Patient to start low dose insulin correction, continue POC glucose monitoring with meals and at bedtime. A1c drawn today.

## 2019-11-03 NOTE — PLAN OF CARE
Consents for Cardiac Surgery , not signed. Patient is Bengali speaking only, Family at bedside. A family member that speaks [place] , communicated to RN from patient's wife that CV surgeon has spoken with the patient and herself on Fri.  And she is expectin

## 2019-11-03 NOTE — PLAN OF CARE
Assumed care of pt at 1. Pt alert and oriented x4, saturating well on RA, SR-SB on tele. Citizen of [place] speaking only, at least one family member is with him at all times to help with interpretation. No complaints of CP or SOB.  Pt did have a headache earlier obtain 12 lead EKG if indicated  - Evaluate effectiveness of antiarrhythmic and heart rate control medications as ordered  - Initiate emergency measures for life threatening arrhythmias  - Monitor electrolytes and administer replacement therapy as ordered

## 2019-11-03 NOTE — PROGRESS NOTES
[facility]  Progress Note     Robert Sheppard Patient Status:  Inpatient    1940 MRN [id number]   [facility] 2NE-A Attending Sybil Chung MD   Hosp Day # 4 PCP PHYSICIAN NONSTAFF     Chief Complaint: abd pain, SOB  CAD fo Lab 10/29/19  2212 10/30/19  0609 11/01/19  0556   TROP 0.066* 0.117* <0.045        Imaging: Imaging data reviewed in Epic. ASSESSMENT / PLAN:   1. New onset Acute CHF  Systolic   1. ECHO  LVEF 40-45%  + WMA   2. cabg planned Monday 2nd case  2.  CAD -

## 2019-11-03 NOTE — PROGRESS NOTES
[facility] Progress Note  Pt seen with Dr Eduard Hess. No c/o chest pain. Family at bedside; one family member who speaks English interpreted for patient and family who had questions for surgeon. Video  cart also at bedside if needed.  Surgery scheduled for 2

## 2019-11-03 NOTE — PROGRESS NOTES
· [facility]      Subjective:  No pain or dyspnea.   Felt sweaty with headache but no fever overnight    Objective:  118/48  Afebrile  SR slow with sleeps  I/O incomplete  K 4.0    Neuro: awake/alert  HEENT: no JVD  Cardiac: S1 S2 regular  Lung

## 2019-11-03 NOTE — PROGRESS NOTES
[facility]  Progress Note    Ronnie Domingo Patient Status:  Inpatient    1940 MRN [id number]   [facility] 2NE-A Attending Tashia Rocha MD   Hosp Day # 4 PCP PHYSICIAN NONSTAFF     Subjective:  Ronnie Domingo is a(n) 70 yea ALT 52  --   --   --   --   --    BILT 0.7  --   --   --   --   --    TP 8.0  --   --   --   --   --     < > = values in this interval not displayed.        Recent Labs   Lab 10/31/19  0740   MG 2.1       No results found for: PHOS, PHOSPHORUS     Recent Blocker)  lisinopril tab 10 mg, 10 mg, Oral, BID  hydrALAzine HCl (APRESOLINE) injection 10 mg, 10 mg, Intravenous, Q3H PRN  acetaminophen (TYLENOL) tab 650 mg, 650 mg, Oral, Q4H PRN    Or  Acetaminophen-Codeine #3 (TYLENOL #3) 300-30 MG tab 1 tablet, 1 ta

## 2019-11-03 NOTE — PLAN OF CARE
Dr. Latricia Dunaway here to see patient, family at bedside. Questions regarding surgical procedure, post procedure, family waiting area, recovery expectations post CABG, hospital length of stay discussed.  Confirmed with family and patient questions regarding surgery

## 2019-11-03 NOTE — PLAN OF CARE
Problem: Patient/Family Goals  Family at bedside, appear very supportive  Daughter interpreting information for family members.   Goal: Patient/Family Long Term Goal  Description  Patient's Long Term Goal: to stay out of the hospital    Interventions:  - feeling of incomplete emptying of bladder or difficulty urinating  Goal: Absence of urinary retention  Description  INTERVENTIONS:  - Assess patient’s ability to void and empty bladder  - Monitor intake/output and perform bladder scan as needed  - Follow u

## 2019-11-04 ENCOUNTER — ANESTHESIA (OUTPATIENT)
Dept: CARDIAC SURGERY | Facility: HOSPITAL | Age: 79
DRG: 233 | End: 2019-11-04
Payer: MEDICARE

## 2019-11-04 ENCOUNTER — APPOINTMENT (OUTPATIENT)
Dept: GENERAL RADIOLOGY | Facility: HOSPITAL | Age: 79
DRG: 233 | End: 2019-11-04
Attending: PHYSICIAN ASSISTANT
Payer: MEDICARE

## 2019-11-04 PROCEDURE — 71045 X-RAY EXAM CHEST 1 VIEW: CPT | Performed by: PHYSICIAN ASSISTANT

## 2019-11-04 PROCEDURE — 99232 SBSQ HOSP IP/OBS MODERATE 35: CPT | Performed by: HOSPITALIST

## 2019-11-04 PROCEDURE — 02100Z9 BYPASS CORONARY ARTERY, ONE ARTERY FROM LEFT INTERNAL MAMMARY, OPEN APPROACH: ICD-10-PCS | Performed by: THORACIC SURGERY (CARDIOTHORACIC VASCULAR SURGERY)

## 2019-11-04 PROCEDURE — 5A1221Z PERFORMANCE OF CARDIAC OUTPUT, CONTINUOUS: ICD-10-PCS | Performed by: THORACIC SURGERY (CARDIOTHORACIC VASCULAR SURGERY)

## 2019-11-04 PROCEDURE — 021009W BYPASS CORONARY ARTERY, ONE ARTERY FROM AORTA WITH AUTOLOGOUS VENOUS TISSUE, OPEN APPROACH: ICD-10-PCS | Performed by: THORACIC SURGERY (CARDIOTHORACIC VASCULAR SURGERY)

## 2019-11-04 PROCEDURE — 99233 SBSQ HOSP IP/OBS HIGH 50: CPT | Performed by: INTERNAL MEDICINE

## 2019-11-04 RX ORDER — DOCUSATE SODIUM 100 MG/1
100 CAPSULE, LIQUID FILLED ORAL 2 TIMES DAILY
Status: DISCONTINUED | OUTPATIENT
Start: 2019-11-04 | End: 2019-11-08

## 2019-11-04 RX ORDER — ASPIRIN 325 MG
325 TABLET, DELAYED RELEASE (ENTERIC COATED) ORAL DAILY
Status: DISCONTINUED | OUTPATIENT
Start: 2019-11-05 | End: 2019-11-08

## 2019-11-04 RX ORDER — MAGNESIUM SULFATE 1 G/100ML
1 INJECTION INTRAVENOUS AS NEEDED
Status: DISCONTINUED | OUTPATIENT
Start: 2019-11-04 | End: 2019-11-06

## 2019-11-04 RX ORDER — SODIUM CHLORIDE 9 MG/ML
INJECTION, SOLUTION INTRAVENOUS CONTINUOUS
Status: DISCONTINUED | OUTPATIENT
Start: 2019-11-04 | End: 2019-11-05

## 2019-11-04 RX ORDER — DEXTROSE AND SODIUM CHLORIDE 5; .45 G/100ML; G/100ML
INJECTION, SOLUTION INTRAVENOUS CONTINUOUS
Status: ACTIVE | OUTPATIENT
Start: 2019-11-04 | End: 2019-11-04

## 2019-11-04 RX ORDER — FAMOTIDINE 10 MG/ML
20 INJECTION, SOLUTION INTRAVENOUS 2 TIMES DAILY
Status: DISCONTINUED | OUTPATIENT
Start: 2019-11-04 | End: 2019-11-05

## 2019-11-04 RX ORDER — DEXMEDETOMIDINE HYDROCHLORIDE 4 UG/ML
INJECTION, SOLUTION INTRAVENOUS CONTINUOUS
Status: DISCONTINUED | OUTPATIENT
Start: 2019-11-04 | End: 2019-11-05

## 2019-11-04 RX ORDER — VANCOMYCIN HYDROCHLORIDE 1 G/20ML
INJECTION, POWDER, LYOPHILIZED, FOR SOLUTION INTRAVENOUS
Status: DISCONTINUED | OUTPATIENT
Start: 2019-11-04 | End: 2019-11-04

## 2019-11-04 RX ORDER — TEMAZEPAM 15 MG/1
15 CAPSULE ORAL NIGHTLY PRN
Status: DISCONTINUED | OUTPATIENT
Start: 2019-11-04 | End: 2019-11-08

## 2019-11-04 RX ORDER — MIDAZOLAM HYDROCHLORIDE 1 MG/ML
1 INJECTION INTRAMUSCULAR; INTRAVENOUS EVERY 30 MIN PRN
Status: DISCONTINUED | OUTPATIENT
Start: 2019-11-04 | End: 2019-11-05

## 2019-11-04 RX ORDER — BISACODYL 10 MG
10 SUPPOSITORY, RECTAL RECTAL
Status: DISCONTINUED | OUTPATIENT
Start: 2019-11-04 | End: 2019-11-08

## 2019-11-04 RX ORDER — MORPHINE SULFATE 4 MG/ML
8 INJECTION, SOLUTION INTRAMUSCULAR; INTRAVENOUS
Status: DISCONTINUED | OUTPATIENT
Start: 2019-11-04 | End: 2019-11-06

## 2019-11-04 RX ORDER — NITROGLYCERIN 20 MG/100ML
INJECTION INTRAVENOUS CONTINUOUS PRN
Status: DISCONTINUED | OUTPATIENT
Start: 2019-11-04 | End: 2019-11-05

## 2019-11-04 RX ORDER — ONDANSETRON 2 MG/ML
4 INJECTION INTRAMUSCULAR; INTRAVENOUS EVERY 6 HOURS PRN
Status: DISCONTINUED | OUTPATIENT
Start: 2019-11-04 | End: 2019-11-08

## 2019-11-04 RX ORDER — ASPIRIN 300 MG
300 SUPPOSITORY, RECTAL RECTAL DAILY
Status: DISCONTINUED | OUTPATIENT
Start: 2019-11-05 | End: 2019-11-05

## 2019-11-04 RX ORDER — ASPIRIN 325 MG
325 TABLET ORAL ONCE
Status: COMPLETED | OUTPATIENT
Start: 2019-11-04 | End: 2019-11-04

## 2019-11-04 RX ORDER — MORPHINE SULFATE 4 MG/ML
2 INJECTION, SOLUTION INTRAMUSCULAR; INTRAVENOUS
Status: DISCONTINUED | OUTPATIENT
Start: 2019-11-04 | End: 2019-11-06

## 2019-11-04 RX ORDER — DEXTROSE MONOHYDRATE 25 G/50ML
50 INJECTION, SOLUTION INTRAVENOUS
Status: DISCONTINUED | OUTPATIENT
Start: 2019-11-04 | End: 2019-11-08

## 2019-11-04 RX ORDER — DEXTROSE AND SODIUM CHLORIDE 5; .45 G/100ML; G/100ML
INJECTION, SOLUTION INTRAVENOUS CONTINUOUS
Status: DISCONTINUED | OUTPATIENT
Start: 2019-11-04 | End: 2019-11-05

## 2019-11-04 RX ORDER — MORPHINE SULFATE 4 MG/ML
4 INJECTION, SOLUTION INTRAMUSCULAR; INTRAVENOUS
Status: DISCONTINUED | OUTPATIENT
Start: 2019-11-04 | End: 2019-11-06

## 2019-11-04 RX ORDER — MORPHINE SULFATE 2 MG/ML
8 INJECTION, SOLUTION INTRAMUSCULAR; INTRAVENOUS
Status: DISCONTINUED | OUTPATIENT
Start: 2019-11-04 | End: 2019-11-04 | Stop reason: SDUPTHER

## 2019-11-04 RX ORDER — DOBUTAMINE HYDROCHLORIDE 200 MG/100ML
INJECTION INTRAVENOUS CONTINUOUS PRN
Status: DISCONTINUED | OUTPATIENT
Start: 2019-11-04 | End: 2019-11-05

## 2019-11-04 RX ORDER — ALBUMIN, HUMAN INJ 5% 5 %
250 SOLUTION INTRAVENOUS ONCE AS NEEDED
Status: DISCONTINUED | OUTPATIENT
Start: 2019-11-04 | End: 2019-11-05

## 2019-11-04 RX ORDER — MORPHINE SULFATE 2 MG/ML
4 INJECTION, SOLUTION INTRAMUSCULAR; INTRAVENOUS
Status: DISCONTINUED | OUTPATIENT
Start: 2019-11-04 | End: 2019-11-04 | Stop reason: SDUPTHER

## 2019-11-04 RX ORDER — HYDROCODONE BITARTRATE AND ACETAMINOPHEN 10; 325 MG/1; MG/1
1 TABLET ORAL EVERY 4 HOURS PRN
Status: DISCONTINUED | OUTPATIENT
Start: 2019-11-04 | End: 2019-11-08

## 2019-11-04 RX ORDER — HYDROCODONE BITARTRATE AND ACETAMINOPHEN 10; 325 MG/1; MG/1
2 TABLET ORAL EVERY 4 HOURS PRN
Status: DISCONTINUED | OUTPATIENT
Start: 2019-11-04 | End: 2019-11-08

## 2019-11-04 RX ORDER — ACETAMINOPHEN 10 MG/ML
1000 INJECTION, SOLUTION INTRAVENOUS EVERY 6 HOURS PRN
Status: DISCONTINUED | OUTPATIENT
Start: 2019-11-04 | End: 2019-11-06

## 2019-11-04 RX ORDER — ALBUMIN, HUMAN INJ 5% 5 %
SOLUTION INTRAVENOUS
Status: DISPENSED
Start: 2019-11-04 | End: 2019-11-05

## 2019-11-04 RX ORDER — CEFAZOLIN SODIUM/WATER 2 G/20 ML
2 SYRINGE (ML) INTRAVENOUS EVERY 8 HOURS
Status: COMPLETED | OUTPATIENT
Start: 2019-11-04 | End: 2019-11-06

## 2019-11-04 RX ORDER — ALBUMIN, HUMAN INJ 5% 5 %
250 SOLUTION INTRAVENOUS ONCE
Status: COMPLETED | OUTPATIENT
Start: 2019-11-04 | End: 2019-11-04

## 2019-11-04 RX ORDER — MAGNESIUM SULFATE HEPTAHYDRATE 40 MG/ML
2 INJECTION, SOLUTION INTRAVENOUS AS NEEDED
Status: DISCONTINUED | OUTPATIENT
Start: 2019-11-04 | End: 2019-11-06

## 2019-11-04 RX ORDER — FAMOTIDINE 20 MG/1
20 TABLET ORAL 2 TIMES DAILY
Status: DISCONTINUED | OUTPATIENT
Start: 2019-11-04 | End: 2019-11-05

## 2019-11-04 RX ORDER — POLYETHYLENE GLYCOL 3350 17 G/17G
1 POWDER, FOR SOLUTION ORAL DAILY PRN
Status: DISCONTINUED | OUTPATIENT
Start: 2019-11-04 | End: 2019-11-08

## 2019-11-04 RX ORDER — CEFAZOLIN SODIUM/WATER 2 G/20 ML
SYRINGE (ML) INTRAVENOUS
Status: DISCONTINUED | OUTPATIENT
Start: 2019-11-04 | End: 2019-11-04

## 2019-11-04 RX ORDER — MORPHINE SULFATE 2 MG/ML
2 INJECTION, SOLUTION INTRAMUSCULAR; INTRAVENOUS
Status: DISCONTINUED | OUTPATIENT
Start: 2019-11-04 | End: 2019-11-04 | Stop reason: SDUPTHER

## 2019-11-04 RX ORDER — CHLORHEXIDINE GLUCONATE 0.12 MG/ML
15 RINSE ORAL
Status: DISCONTINUED | OUTPATIENT
Start: 2019-11-04 | End: 2019-11-05

## 2019-11-04 RX ORDER — ASPIRIN 300 MG
300 SUPPOSITORY, RECTAL RECTAL ONCE
Status: COMPLETED | OUTPATIENT
Start: 2019-11-04 | End: 2019-11-04

## 2019-11-04 RX ORDER — IPRATROPIUM BROMIDE AND ALBUTEROL SULFATE 2.5; .5 MG/3ML; MG/3ML
3 SOLUTION RESPIRATORY (INHALATION) EVERY 4 HOURS PRN
Status: DISCONTINUED | OUTPATIENT
Start: 2019-11-04 | End: 2019-11-08

## 2019-11-04 NOTE — PLAN OF CARE
Assumed care of pt. This morning at 0700. Pt. Sitting up in bed. Primarily Montenegrin speaking. Family present to interpret. Alert and oriented x4. Sats mid 90s on room air. Current smoker. Lung sounds diminished, expiratory wheezes TORRIE bases. NSR/SB.  Blood

## 2019-11-04 NOTE — PROGRESS NOTES
[facility]  Progress Note    Luís Hoskins Patient Status:  Inpatient    1940 MRN [id number]   [facility] 6NE-A Attending Everardo Keating MD   Hosp Day # 5 [facility]  Progress Note        Cristino aspirin  300 mg Rectal Daily   • docusate sodium  100 mg Oral BID    Or   • docusate sodium  100 mg Oral BID   • famoTIDine  20 mg Oral BID    Or   • famoTIDine  20 mg Intravenous BID   • vancomycin  15 mg/kg Intravenous Q12H   • mupirocin  1 Application N was tuneup and patient was referred for CABG. He is hemodynamically stable on Levophed at 1 mcg/min and dobutamine at 3 mcg/kg/min. he is sedated and intubated on insulin drip as well. CVP is 8.   Telemetry showed stable sinus rhythm with no prognostica

## 2019-11-04 NOTE — PLAN OF CARE
Received patient at  from cardiac operating room. Patient sedated with precedex. Dobutamine at 8 mcg/kg/min, levophed at 2 mcg/kg/min, insulin per protocol. Chest tube draining minimal drainage.   Dr. Kae Lowery called with abg weaned fio2 abg at 2000

## 2019-11-04 NOTE — CM/SW NOTE
Patient awaiting CABG.   SW to follow post op with [address] referral.    James Akhtar LCSW

## 2019-11-04 NOTE — ANESTHESIA POSTPROCEDURE EVALUATION
[address] Patient Status:  Inpatient   Age/Gender 70year old male MRN [id number]   Location [address] [place] [address] Attending Kayla Gonzales MD   [address] Day # 5 PCP PHYSICIAN NONSTAFF       Anesthesia Post-op Note

## 2019-11-04 NOTE — PROGRESS NOTES
[facility]  Progress Note    Kamala Ochoa Patient Status:  Inpatient    1940 MRN [id number]   [facility] 6NE-A Attending Anthony Centeno MD   [facility] Day # 5 PCP PHYSICIAN NONSTAFF     Subjective:  Kamala Ochoa is a(n) 70 yea 31.4 27.7    < > = values in this interval not displayed.        Cultures: Reviewed    Radiology:  Xr Chest Ap Portable  (cpt=71045)    Result Date: 11/4/2019  CONCLUSION:    Endotracheal tube 3.5 cm above the carinal, [place] catheter tip in the main pul needed  · Continuing to follow with you     LUPE Pozo MD  11/4/2019  5:24 PM

## 2019-11-04 NOTE — PROGRESS NOTES
Anesthesia Ventilator Management    Patient is s/p CABG 2V  POD#0. Patient is currently sedated and intubated. Vent settings: PRVC 500/12/60%/5  ABG, CXR pending    Kvng@[employer]  @8  Levo@ 2    Will wean FiO2 as tolerated.   Plan for extubation after CPAP t

## 2019-11-04 NOTE — PAYOR COMM NOTE
--------------  CONTINUED STAY REVIEW    Payor: Yamila  Subscriber #:  [id number]  Authorization Number: [id number]    Admit date: 10/30/19  Admit time: [facility]    Admitting Physician: Cecil Botello MD  Attending Physician:  Kurtis Schneider MD --  [phone]  --    K 3.8 3.5   < > 3.8 3.6 4.0    102  --  103 104  --    CO2 24.0 28.0  --  27.0 27.0  --    ALKPHO 109  --   --   --   --   --    AST 62*  --   --   --   --   --    ALT 52  --   --   --   --   --    BILT 0.7  --   --   --   --   -- 20 mg, Oral, Daily  atorvastatin (LIPITOR) tab 40 mg, 40 mg, Oral, Nightly  aspirin chewable tab 324 mg, 324 mg, Oral, Nightly  metoprolol Tartrate (LOPRESSOR) tab 25 mg, 25 mg, Oral, 2x Daily(Beta Blocker)  lisinopril tab 10 mg, 10 mg, Oral, BID  hydrALAz Given 25 mg Oral Cher Sever, RN      mupirocin (BACTROBAN) 2% nasal ointment OINT 1 Application     Date Action Dose Route User    11/4/2019 380 Given 1 Application Nasal (Bilateral Nares) Marquez Padilla RN    11/3/2019 8905 Given 1 Application N

## 2019-11-04 NOTE — DIETARY NOTE
Clinical Nutrition Note    Dietitian consult received per cardiac rehab/CHF protocol. Pt to be educated by cardiac rehab staff and encouraged to attend outpatient classes taught by KAREN. KAREN available PRN.     Negin Powers RD, [address] [facility]  Pager #[phone] #[phone]

## 2019-11-04 NOTE — ANESTHESIA PREPROCEDURE EVALUATION
PRE-OP EVALUATION    Patient Name: Robert Sheppard    Pre-op Diagnosis: CORONARY ARTERY DISEASE    Procedure(s):  coronary artery bypass graft                    IP 2605    Surgeon(s) and Role:     * Jimmy Velazquez MD - Primary    Pre-op vitals reviewed. Once  atorvastatin (LIPITOR) tab 40 mg, 40 mg, Oral, Nightly  [] Potassium Chloride ER (K-DUR M20) CR tab 40 mEq, 40 mEq, Oral, Q4H  aspirin chewable tab 324 mg, 324 mg, Oral, Nightly  [COMPLETED] lidocaine (PF) (XYLOCAINE) 1 % injection, , ,   [COM circumflex chronic total occlusion. 3.     A 100% proximal right coronary artery chronic total occlusion. RCA was probably dominant system. 4.     Left main coronary artery with no disease angiographically.   5.     LAD artery with mild disease in proxim unstable angina pectoris (HCC) Chest pain   Abnormal urinalysis DVT prophylaxis   Hyperglycemia        Past Surgical History:   Procedure Laterality Date   • PROSTATE BIOPSIES       Social History    Tobacco Use      Smoking status: Current Every Day Smoke hemodynamic status is stable. Discussed rare risk of dental trauma from intubation, scratchy throat, and postop nausea and vomiting. Discussed possible use of blood products. We discussed postoperative usage of sedatives, analgesics and anxiolytics.   All

## 2019-11-04 NOTE — PLAN OF CARE
Pt Icelandic speaking- family at bedside.  Vital signs stable-  Tele NSR- lung sound clear- sat room air 94%  Cardiac angiogram shows MVD-   CABG in am- consent signed and on chart  Pt stable-  Problem: CARDIOVASCULAR - ADULT  Goal: Absence of cardiac arrhyt ordered  - Assess for signs and symptoms of hyperglycemia and hypoglycemia  - Administer ordered medications to maintain glucose within target range  - Assess barriers to adequate nutritional intake and initiate nutrition consult as needed  - Instruct cristal

## 2019-11-04 NOTE — PROGRESS NOTES
[facility]  Progress Note     Ronnie Domingo Patient Status:  Inpatient    1940 MRN [id number]   [facility] 2NE-A Attending Tashia Rocha MD   Hosp Day # 5 PCP PHYSICIAN NONSTAFF     Chief Complaint: abd pain, SOB  CAD fo 8.0  --   --   --   --   --     < > = values in this interval not displayed. Estimated Creatinine Clearance: 53.8 mL/min (based on SCr of 1 mg/dL).   Recent Labs   Lab 10/29/19  2212 11/04/19  1443   PTP 13.3 19.4*   INR 0.97 1.55*     Recent Labs   Lab Medicine Hospitalist  Pager [phone] cell [phone]

## 2019-11-05 ENCOUNTER — APPOINTMENT (OUTPATIENT)
Dept: GENERAL RADIOLOGY | Facility: HOSPITAL | Age: 79
DRG: 233 | End: 2019-11-05
Attending: THORACIC SURGERY (CARDIOTHORACIC VASCULAR SURGERY)
Payer: MEDICARE

## 2019-11-05 PROCEDURE — 71045 X-RAY EXAM CHEST 1 VIEW: CPT | Performed by: THORACIC SURGERY (CARDIOTHORACIC VASCULAR SURGERY)

## 2019-11-05 PROCEDURE — 99231 SBSQ HOSP IP/OBS SF/LOW 25: CPT | Performed by: HOSPITALIST

## 2019-11-05 PROCEDURE — 99233 SBSQ HOSP IP/OBS HIGH 50: CPT | Performed by: INTERNAL MEDICINE

## 2019-11-05 RX ORDER — DEXTROSE MONOHYDRATE 25 G/50ML
50 INJECTION, SOLUTION INTRAVENOUS
Status: DISCONTINUED | OUTPATIENT
Start: 2019-11-05 | End: 2019-11-08

## 2019-11-05 RX ORDER — LOSARTAN POTASSIUM 25 MG/1
25 TABLET ORAL DAILY
Status: DISCONTINUED | OUTPATIENT
Start: 2019-11-05 | End: 2019-11-08

## 2019-11-05 RX ORDER — PANTOPRAZOLE SODIUM 40 MG/1
40 TABLET, DELAYED RELEASE ORAL
Status: DISCONTINUED | OUTPATIENT
Start: 2019-11-05 | End: 2019-11-08

## 2019-11-05 RX ORDER — FUROSEMIDE 10 MG/ML
40 INJECTION INTRAMUSCULAR; INTRAVENOUS ONCE
Status: COMPLETED | OUTPATIENT
Start: 2019-11-05 | End: 2019-11-05

## 2019-11-05 NOTE — OPERATIVE REPORT
[facility]    PATIENT'S NAME: Guillermina Gibbs   ATTENDING PHYSICIAN: Peterson Barraza M.D.    OPERATING PHYSICIAN: Cassandra Mackey MD   PATIENT ACCOUNT#:   [id number]    LOCATION:  [address] [place] [address]  MEDICAL RECORD #:   [id number]       DATE OF BIRTH:  [birth year], following which the proximal anastomoses for the 2 vein grafts were constructed end-to-side to the aorta. Warm cardioplegia was given. The aorta was unclamped.   Following complete and thorough rewarming, the patient was weaned from bypass without

## 2019-11-05 NOTE — PAYOR COMM NOTE
--------------  CONTINUED STAY REVIEW    Payor: Yamila  Subscriber #:  [id number]  Authorization Number: [id number]    Admit date: 10/30/19  Admit time: [address]    Admitting Physician: Cecil Botello MD  Attending Physician:  Kurtis Schneider MD --   --   --   --   --    ALT 52  --   --   --   --   --    BILT 0.7  --   --   --   --   --    TP 8.0  --   --   --   --   --     < > = values in this interval not displayed.      Estimated Creatinine Clearance: 53.8 mL/min (based on SCr of 1 mg/dL). and agree with eduar faria's assessment and plan for this patient.     Kena Rawls MD  [facility]  Internal Medicine Hospitalist  Pager [phone]  Cell [phone]                  Electronically signed by Javier Marie MD at 11/5/2019  7: famoTIDine  20 mg Oral BID     Or   • famoTIDine  20 mg Intravenous BID   • vancomycin  15 mg/kg Intravenous Q12H   • mupirocin  1 Application Nasal BID   • ceFAZolin  2 g Intravenous Q8H   • ceFAZolin  2 g Intravenous On Call   • enoxaparin  40 mg Subcuta mcg/min and dobutamine at 3 mcg/kg/min. he is sedated and intubated on insulin drip as well. CVP is 8. Telemetry showed stable sinus rhythm with no prognostically significant arrhythmia.   Initial EKG showed 1 mm ST segment depression in anterolateral shannan 11/4/2019 1200 Given 2 g Intravenous Emily Leslie MD      ceFAZolin sodium (ANCEF/KEFZOL) 2 GM/20ML premix IV syringe 2 g     Date Action Dose Route User    11/4/2019 2327 Given 2 g Intravenous Elisa Nogueira RN    11/4/2019 1626 Given 2 g Int Carlo Abel RN    11/4/2019 1800 Rate/Dose Verify 4 mcg/kg/min × 71.4 kg Intravenous Carlo Abel RN    11/4/2019 1700 Rate/Dose Change 4 mcg/kg/min × 71.4 kg Intravenous Carlo Abel RN    11/4/2019 1621 Rate/Dose Change 5 mcg/kg/min × 71.4 kg Intr Intravenous Melisa Ward RN    11/4/2019 2200 Rate/Dose Change 8 Units/hr Intravenous Melisa Ward RN    11/4/2019 2100 Rate/Dose Verify 6 Units/hr Intravenous Melisa Ward RN    11/4/2019 2000 Rate/Dose Change 6 Units/hr Intravenous Karla Lam 11/5/2019 0200 Rate/Dose Verify (none) Intravenous Yue Sprague RN    11/5/2019 0000 Rate/Dose Verify (none) Intravenous Yue Sprague RN    11/4/2019 2200 Rate/Dose Verify (none) Intravenous Yue Sprague RN    11/4/2019 2000 Rate/Dose Verify (n

## 2019-11-05 NOTE — PROGRESS NOTES
[facility]  Progress Note    Ronnie Domingo Patient Status:  Inpatient    1940 MRN [id number]   [facility] 6NE-A Attending Tashia Rocha MD   Hosp Day # 6 PCP PHYSICIAN NONSTAFF       Subjective:  Complains of surgical discomf chest tubes. No pneumothorax is seen. A background of moderate vascular congestion and volume overload is   essentially stable.   Low lung volumes limit assessment      Medications:    • Insulin Aspart Pen  1-10 Units Subcutaneous TID AC and HS   • pantop

## 2019-11-05 NOTE — PHYSICAL THERAPY NOTE
PHYSICAL THERAPY EVALUATION - INPATIENT     Room Number: 3162/7927-Q  Evaluation Date: 11/5/2019  Type of Evaluation: Initial  Physician Order: PT Eval and Treat    Presenting Problem: CABG x2 vessels on 11/4/19  Reason for Therapy: Mobility Dysfunction PAIN ASSESSMENT  Ratin          COGNITION  · Overall Cognitive Status:  WFL - within functional limits    RANGE OF MOTION AND STRENGTH ASSESSMENT  Upper extremity ROM and strength are within functional limits     Lower extremity ROM is within funct stand>sit to chair with SBA. BP assessed in static sitting, RNJ=263.     Exercise/Education Provided:  Bed mobility  Body mechanics  Functional activity tolerated  Gait training  Strengthening  Transfer training    Patient End of Session: Up in chair;Needs sternal precautions. Goal #5 The pt will ascend/descend 2 stairs with use of a handrail with SBA.    Goal #6    Goal Comments: Goals established on 11/5/2019

## 2019-11-05 NOTE — CONSULTS
[facility]  Report of Consultation    Kerry Wisdom Patient Status:  Inpatient    1940 MRN [id number]   [facility] 6NE-A Attending Ryanne Canales MD   Hosp Day # 6 PCP PHYSICIAN NONSTAFF     Reason for Consultation:  hyper (PRECEDEX) 400 MCG/100ML premix infusion, 0.2-1.5 mcg/kg/hr, Intravenous, Continuous  •  ipratropium-albuterol (DUONEB) nebulizer solution 3 mL, 3 mL, Nebulization, Q4H PRN  •  [] dextrose 5 %-0.45 % NaCl infusion, , Intravenous, Continuous **FOLLOW mEq, Intravenous, PRN  •  calcium gluconate 3 g in sodium chloride 0.9% 100 mL IVPB, 3 g, Intravenous, PRN  •  Magnesium Sulfate in D5W IVPB premix 1 g, 1 g, Intravenous, PRN  •  Magnesium Sulfate IVPB premix SOLN 2 g, 2 g, Intravenous, PRN  •  Vancomycin acute distress, well groomed   Eyes: anicteric sclera, perrla, eomi  ENT: op clear, no lesions noted, MMM  Neck: supple, no thyromegaly or nodules palpable  Lymph: no neck or supraclavicular lymphadenopathy  Cardiovascular:  RRR; no murmurs 2+ pedal pulses fully as possible. I will continue to follow closely while the patient is in the hospital.  Please feel free to contact me with any questions or concerns.       Asaf Flor MD  Endocrinology, Diabetes, and Metabolism  [facility]  11/5/20

## 2019-11-05 NOTE — DIETARY NOTE
[facility]    NUTRITION ASSESSMENT    Pt does not meet malnutrition criteria.     NUTRITION DIAGNOSIS/PROBLEM:    Possible food and nutrition-related knowledge deficit related to possible lack of previous diet ed as evidenced by consult for HF diet edu per EMR.     NUTRITION:  Diet: Cardiac / 1.5gm NA / 1500 ml  Oral Supplements:Ensure HP    FOOD/NUTRITION RELATED HISTORY:  Appetite: Fair  Intake: 50%  Intake Meeting Needs: No, but supplements to maximize  Food Allergies: No  Cultural/Ethnic/Samaritan Pre

## 2019-11-05 NOTE — PLAN OF CARE
Pt received intubated on full vent support, REDDY, following all commands. Pt placed on CPAP via vent at 2000. At 2100 Dr. Garibay Poster called and notified of ABG results by RT, orders received to extubated. Order received by this RN for IV tylenol PRN.  Pt extu

## 2019-11-05 NOTE — PROGRESS NOTES
[facility]  Progress Note    Kymberly Hamlin Patient Status:  Inpatient    1940 MRN [id number]   [facility] 6NE-A Attending Matty Yeh MD   [facility] Day # 6 PCP PHYSICIAN NONSTAFF     Subjective:  Kymberly Hamlin is a(n) 70 yea 37.7*   MCV 94.9  --  94.9  --  95.9 95.7   MCH 31.9  --  31.4  --  30.9 31.5   MCHC 33.6  --  33.1  --  32.3 32.9   RDW 13.4  --  13.2  --  13.0 13.2   NEPRELIM 13.58*  --  6.47  --   --  20.49*   WBC 17.4*  --  11.1*  --  21.2* 23.3*   .0   < > 35

## 2019-11-05 NOTE — RESPIRATORY THERAPY NOTE
Patient completed 1 hour of CPAP/PS support trial and did well. ABG was performed and weaning parameters called to Dr. Celia Adan. He ordered to extubate and was placed on 50% venturi mask at this time. No complications during extubation.  Suctioned pre and

## 2019-11-05 NOTE — PLAN OF CARE
Received in AM sitting up in chair. C/o mid-sternal pain, mediated per MAR. All lines removed per order. Tolerated well. Walked in the hallway with standby assist. VSS. Transitioned to SQ Insulin, tolerating diet. Resting comfortably, family at bedside.  Up

## 2019-11-05 NOTE — CM/SW NOTE
Maggie Adames accepting patient. CM/SW will send AVS when patient dc'd. KISHOR/ALTAF will follow up with Maggie re: actual dc date.     Nelwyn Meigs  [facility]  [phone]

## 2019-11-05 NOTE — PROGRESS NOTES
[facility]  CV Surgery Progress Note    Beltran Woodard Patient Status:  Inpatient    1940 MRN [id number]   [facility] 6NE-A Attending Shima Bates MD   Hosp Day # 6 PCP PHYSICIAN NONSTAFF     Subjective:  Patient seen this AM movable of endotracheal and enteric tubes. Otherwise essentially stable chest.  Favor mild to moderate volume overload. Continued follow-up is suggested.     Dictated by: Александр Arriola MD on 11/05/2019 at 6:57     Approved by: Александр Arriola MD on 11/05/2019 at

## 2019-11-05 NOTE — PROGRESS NOTES
[facility]  Progress Note     Mejia Ochoa Patient Status:  Inpatient    1940 MRN [id number]   [facility] 2NE-A Attending Marshall Ni MD   Hosp Day # 6 PCP PHYSICIAN NONSTAFF     Chief Complaint: abd pain, SOB  CAD fo --    AST 62*  --   --   --   --   --    ALT 52  --   --   --   --   --    BILT 0.7  --   --   --   --   --    TP 8.0  --   --   --   --   --     < > = values in this interval not displayed.      Estimated Creatinine Clearance: 50.8 mL/min (based on SCr of NP     Chief complaint: chest pain    Subjective:  No acute events overnight. Pain controlled. No sob. ROS:   7 point ROS negative except for that stated in subjective.     Objective:  Constitutional: no signs of distress; vitals stable  Cardiovascular:

## 2019-11-05 NOTE — OCCUPATIONAL THERAPY NOTE
OCCUPATIONAL THERAPY EVALUATION - INPATIENT     Room Number: 4536/2646-K  Evaluation Date: 11/5/2019  Type of Evaluation: Initial  Presenting Problem: CABG    Physician Order: IP Consult to Occupational Therapy  Reason for Therapy: ADL/IADL Dysfunction and Cognitive Status:  WFL - within functional limits    VISION  Current Vision: no visual deficits    PERCEPTION  Overall Perception Status:   WFL - within functional limits    SENSATION  Light touch:  intact    Communication: Pt is able to communicate all ba with CGA, pt was able to amb x1 in room from bed to chair with min assist, pt able to amb in hallway with min assist, therapist assist pt to complete t/fs, education.   Patient End of Session: Up in chair;Needs met;Call light within reach;RN aware of darrell strengthening/ROM; Functional transfer training  Rehab Potential : Good  Frequency (Obs): 5x/week  Number of Visits to Meet Established Goals: 5    ADL Goals   Patient will perform upper body dressing:  with supervision  Patient will perform lower body dres

## 2019-11-06 PROCEDURE — 99232 SBSQ HOSP IP/OBS MODERATE 35: CPT | Performed by: INTERNAL MEDICINE

## 2019-11-06 RX ORDER — TRAMADOL HYDROCHLORIDE 50 MG/1
50 TABLET ORAL EVERY 6 HOURS PRN
Status: DISCONTINUED | OUTPATIENT
Start: 2019-11-06 | End: 2019-11-07

## 2019-11-06 RX ORDER — FLUTICASONE PROPIONATE 50 MCG
1 SPRAY, SUSPENSION (ML) NASAL 2 TIMES DAILY
Status: DISCONTINUED | OUTPATIENT
Start: 2019-11-06 | End: 2019-11-08

## 2019-11-06 RX ORDER — GUAIFENESIN 600 MG
600 TABLET, EXTENDED RELEASE 12 HR ORAL 2 TIMES DAILY
Status: DISCONTINUED | OUTPATIENT
Start: 2019-11-06 | End: 2019-11-08

## 2019-11-06 NOTE — PLAN OF CARE
Tele monitoring. I/o. Daily weight. Teds/scd's . Family at bedside refusing translation line. Gluco scan qid. a1c 6.2. 1500 cc fluid restriction. norco given times one for c/o incisional pain rated 3/10. Iv antibiotics given per post op orders.  Up arrhythmias  - Monitor electrolytes and administer replacement therapy as ordered  Outcome: Progressing     Problem: GENITOURINARY - ADULT  Goal: Absence of urinary retention  Description  INTERVENTIONS:  - Assess patient’s ability to void and empty bladde mobility and gait  - Educate and engage patient/family in tolerated activity level and precautions  - Pt to ambulate TID with staff supervision   Outcome: Progressing     Problem: Impaired Activities of Daily Living  Goal: Achieve highest/safest level of i

## 2019-11-06 NOTE — PLAN OF CARE
Pacer wires removed at 1520 pm with no difficulty. Patient walked around the floor 300 feet and tolerated it well.

## 2019-11-06 NOTE — PROGRESS NOTES
[facility]  Progress Note    Oralbella Hebert Patient Status:  Inpatient    1940 MRN [id number]   [facility] 8NE-A Attending Richard Davis MD   Hosp Day # 7 PCP PHYSICIAN NONSTAFF       SUBJECTIVE:  No acute events overnig Not on file    Other Topics      Concerns:        Not on file    Social History Narrative      Not on file        OBJECTIVE:  Wt Readings from Last 3 Encounters:  11/06/19 : 160 lb 15 oz (73 kg)    Constitutional Vital signs in last 24 hours:/61 (BP tab 8 tablet, 8 tablet, Oral, Q15 Min PRN  Insulin Aspart Pen (NOVOLOG) 100 UNIT/ML flexpen 1-10 Units, 1-10 Units, Subcutaneous, TID AC and HS  Pantoprazole Sodium (PROTONIX) EC tab 40 mg, 40 mg, Oral, QAM AC  Losartan Potassium (COZAAR) tab 25 mg, 25 mg, 1,000 mg, 1,000 mg, Intravenous, Q6H PRN  atorvastatin (LIPITOR) tab 40 mg, 40 mg, Oral, Nightly  metoprolol Tartrate (LOPRESSOR) tab 25 mg, 25 mg, Oral, 2x Daily(Beta Blocker)  hydrALAzine HCl (APRESOLINE) injection 10 mg, 10 mg, Intravenous, Q3H PRN  finesse

## 2019-11-06 NOTE — PROGRESS NOTES
Pt sleeping with 02 2l nc applied.        11/05/19 2240 11/05/19 2241 11/05/19 2242   Vital Signs   Pulse 74 78 75   Oxygen Therapy   SpO2 (!) 86 % (!) 89 % (!) 86 %      11/05/19 2243 11/05/19 2244 11/05/19 2245   Vital Signs   Pulse 72 78 78   Oxygen Ther

## 2019-11-06 NOTE — PROGRESS NOTES
02 3 lnc reapplied.  Pt was on room air        11/06/19 0140 11/06/19 0142 11/06/19 0143   Oxygen Therapy   SpO2  --  (!) 87 % (!) 87 %   O2 Device Nasal cannula  --   --    O2 Flow Rate (L/min) 3 L/min  --   --       11/06/19 0145   Oxygen Therapy   SpO2 (

## 2019-11-06 NOTE — PROGRESS NOTES
[facility]  Progress Note    Pkao Bourne Patient Status:  Inpatient    1940 MRN [id number]   [facility] 6NE-A Attending Martha Quintero MD   [facility] Day # 7 PCP PHYSICIAN NONSTAFF     Subjective:  Pako Bourne is a(n) 52 yea 32.9   RDW 13.4  --  13.2  --  13.0 13.2   NEPRELIM 13.58*  --  6.47  --   --  20.49*   WBC 17.4*  --  11.1*  --  21.2* 23.3*   .0   < > 354.0 259.0 257.0 274.0    < > = values in this interval not displayed.      Recent Labs   Lab 11/04/19  1535 11/

## 2019-11-06 NOTE — PLAN OF CARE
Pt received alert/oriented x4, REDDY, following all commands. Pt denies any pain or SOB. Midsternal incision CDI, SAQIB, no drainage noted. Left leg incision CDI, SAQIB, no drainage noted but bruising present.  Ventricular pacer leads secured and taped to abdomen

## 2019-11-06 NOTE — PAYOR COMM NOTE
--------------  CONTINUED STAY REVIEW    [facility] [address] #:  [id number]  Authorization Number: [id number]    Admit date: 10/30/19  Admit time: [address]    Admitting Physician: Gabriela Saini MD  Attending Physician:  Michael Peguero, 11/04/19  1535   PTP 19.4* 17.0*   INR 1.55* 1.32*          Recent Labs   Lab 11/01/19  0556   TROP <0.045      Imaging: Imaging data reviewed in Epic. ASSESSMENT / PLAN: 1.   New onset Acute CHF  Systolic   1.  ECHO  LVEF 40-45%  + WMA   2. cabg  X2 RCA/ MEDICATIONS ADMINISTERED IN LAST 1 DAY:  aspirin EC EC tab 325 mg     Date Action Dose Route User    11/6/2019 0841 Given 325 mg Oral Briseida Miguel, RN      atorvastatin (LIPITOR) tab 40 mg     Date Action Dose Route User    11/5/2019 2003 Given (PROTONIX) EC tab 40 mg     Date Action Dose Route User    11/6/2019 0505 Given 40 mg Oral Dominik Vuong, DOROTHEA      temazepam (RESTORIL) cap 15 mg     Date Action Dose Route User    11/5/2019 2131 Given 15 mg Oral Dominik Vuong RN      traMADol HCl (U

## 2019-11-06 NOTE — PROGRESS NOTES
[facility]  Cardiology Progress Note    Rosi Diaz Patient Status:  Inpatient    1940 MRN [id number]   [facility] 8NE-A Attending Jennifer Anaya MD   Hosp Day # 7 PCP PHYSICIAN NONSTAFF     Subjective:  Mild incisional pa Daily(Beta Blocker)   • nicotine  1 patch Transdermal Daily       Assessment:  · CAD s/p cabg x 2 (OM, PDA) 11/4- EF 40-45%- EKG on 11/5 with signs of pericarditis, pain improved after CT removal yesterday   · Leukocytosis- ?  Reactive/post op inflammatory

## 2019-11-06 NOTE — PROGRESS NOTES
[facility]  Progress Note     Kymberly Hamlin Patient Status:  Inpatient    1940 MRN [id number]   [facility] 2NE-A Attending Matty Yeh MD   Hosp Day # 7 PCP PHYSICIAN NONSTAFF     Chief Complaint: abd pain, SOB  CAD fo reviewed in Epic. ASSESSMENT / PLAN:   1. New onset Acute CHF  Systolic   1. ECHO  LVEF 40-45%  + WMA   2. cabg  X2 RCA/ OM w/ SVG  11/4   3. Cards/ CVS following  4. Pain control add ultram   5. Ambulate IS/ flutter valve      2.    CAD -   S/p CABG x2

## 2019-11-06 NOTE — PROGRESS NOTES
[facility]  CV Surgery Progress Note    Ronnie Domingo Patient Status:  Inpatient    1940 MRN [id number]   [facility] 6NE-A Attending Tashia Rocha MD   Hosp Day # 7 PCP PHYSICIAN NONSTAFF     Subjective:  Patient seen this si function intact  -Vol OK  -Pain management: tramadol  -Encourage IS/Ambulation   -Discharge planning: anticipated next 24-48 hours   -Pacer wires: hopefully out today, will d/w Dr. Kosta Garvin.        Maricarmen Lorenzana PA-C/Ralf  11/6/2019  7:35 AM

## 2019-11-06 NOTE — PLAN OF CARE
Assumed pt care at 0730. A&Ox4. RA, denies pain/SOB, Normal Sinus Rhythm on tele. Up with Standby assist. Pt is POD2 from CABG. Pacer wires in, pressure dressing on. Sternal incision and leg incision painted with betadine.  Pt has had two walks so far today monitoring, monitor vital signs, obtain 12 lead EKG if indicated  - Evaluate effectiveness of antiarrhythmic and heart rate control medications as ordered  - Initiate emergency measures for life threatening arrhythmias  - Monitor electrolytes and administe Impaired Functional Mobility  Goal: Achieve highest/safest level of mobility/gait  Description  Interventions:  - Assess patient's functional ability and stability  - Promote increasing activity/tolerance for mobility and gait  - Educate and engage patient

## 2019-11-07 ENCOUNTER — APPOINTMENT (OUTPATIENT)
Dept: GENERAL RADIOLOGY | Facility: HOSPITAL | Age: 79
DRG: 233 | End: 2019-11-07
Attending: PHYSICIAN ASSISTANT
Payer: MEDICARE

## 2019-11-07 PROCEDURE — 99233 SBSQ HOSP IP/OBS HIGH 50: CPT | Performed by: INTERNAL MEDICINE

## 2019-11-07 PROCEDURE — 71045 X-RAY EXAM CHEST 1 VIEW: CPT | Performed by: PHYSICIAN ASSISTANT

## 2019-11-07 PROCEDURE — 99232 SBSQ HOSP IP/OBS MODERATE 35: CPT | Performed by: INTERNAL MEDICINE

## 2019-11-07 RX ORDER — HYDROCODONE BITARTRATE AND ACETAMINOPHEN 5; 325 MG/1; MG/1
1-2 TABLET ORAL
Qty: 60 TABLET | Refills: 0 | Status: SHIPPED | OUTPATIENT
Start: 2019-11-07

## 2019-11-07 RX ORDER — NICOTINE 21 MG/24HR
1 PATCH, TRANSDERMAL 24 HOURS TRANSDERMAL DAILY
Qty: 28 PATCH | Refills: 1 | Status: SHIPPED | OUTPATIENT
Start: 2019-11-08

## 2019-11-07 NOTE — PROGRESS NOTES
A&ox4, RA w/ 2L at noc, , NSR w/ PVC's, non-pitting edema in bilateral feet, TEDs removed tonight, SCDs on, voids, up SBA, QID accucheck  R arm nicotine  Midsternal and L leg incisions w/ steri-strips, intact  CXR in am  Planning for discharge Friday

## 2019-11-07 NOTE — PROGRESS NOTES
[facility]  CV Surgery Progress Note    Apple Hallmark Patient Status:  Inpatient    1940 MRN [id number]   [facility] 6NE-A Attending Alix Moreno MD   Hosp Day # 8 PCP PHYSICIAN NONSTAFF     Subjective:  Patient seen this AM 11/07/2019 at 8:04            Assessment/Plan: s/p CABG POD#3  -HD stable   -Post op leukocytosis: trending down, low grade temps, no sign of active infection   -Vol OK  -Pain management: norco rx in chart  -Encourage IS/Ambulation   -Discharge planning: a

## 2019-11-07 NOTE — PAYOR COMM NOTE
--------------  CONTINUED STAY REVIEW    Payor: [address] #:  [id number]  Authorization Number: [id number]    Admit date: 10/30/19  Admit time: [address]    Admitting Physician: Jil Guevara MD  Attending Physician:  Emilee Allen Non-  >=60 80    GFR, -American  >=60 92      Ref Range & Units 11/7/19 0539   WBC  4.0 - 11.0 x10(3) uL 17.4High     RBC  3.80 - 5.80 x10(6)uL 3.82    HGB  13.0 - 17.5 g/dL 11.8Low     HCT  39.0 - 53.0 % 36.2Low     PLT  150.0 - 450 Buddy Young RN      guaiFENesin ER [facility]) 12 hr tab 600 mg     Date Action Dose Route User    11/7/2019 0824 Given 600 mg Oral Aura Anand RN    11/6/2019 2011 Given 600 mg Oral Zamzam Dugan RN      HYDROcodone-acetaminophen (NORCO)  MG per tab 1 tab

## 2019-11-07 NOTE — PLAN OF CARE
Received bedside report on this Pt., at 1935. Pt., awake, A&O, Surinamese speaking, his daughter at bedside interpreting. Pt., is in SR on Tele monitor, sats greater than 92% on RA when awake, desats to low 80s when sleeping, 2L Oxygen per NC applied.   Pt.,

## 2019-11-07 NOTE — PROGRESS NOTES
[facility]  Progress Note     Kerry Wisdom Patient Status:  Inpatient    1940 MRN [id number]   [facility] 2NE-A Attending Ryanne Canales MD   Hosp Day # 8 PCP PHYSICIAN NONSTAFF     Chief Complaint: abd pain, SOB  CAD fo following  4. Pain control    5. Ambulate IS/ flutter valve    6. Breathing improving   7. ASA, BB cozaar, statin     2. CAD -   S/p CABG x2   11/4   1. Cards/ CVS   to follow  2. pulm following       2.  COPD   Inhalers per pulm, needs noc O2 still cont 99%   BMI 26.19 kg/m²   CV: RRR  PULM: CTAB

## 2019-11-07 NOTE — PROGRESS NOTES
[facility]  Progress Note    Aimee Crouch Patient Status:  Inpatient    1940 MRN [id number]   [facility] 8NE-A Attending Kristeen Hashimoto, MD   Hosp Day # 8 PCP PHYSICIAN NONSTAFF       SUBJECTIVE:  No acute events overnig Not on file    Other Topics      Concerns:        Not on file    Social History Narrative      Not on file        OBJECTIVE:  Wt Readings from Last 3 Encounters:  11/07/19 : 157 lb 6.4 oz (71.4 kg)    Constitutional Vital signs in last 24 hours:/45 Daily  ipratropium-albuterol (DUONEB) nebulizer solution 3 mL, 3 mL, Nebulization, Q4H PRN  HYDROcodone-acetaminophen (NORCO)  MG per tab 1 tablet, 1 tablet, Oral, Q4H PRN    Or  HYDROcodone-acetaminophen (NORCO)  MG per tab 2 tablet, 2 tablet, the patient is in the hospital.  Please feel free to contact me with any questions or concerns.       Nuno Urena MD  Endocrinology, Diabetes, and Metabolism  [facility]

## 2019-11-07 NOTE — PLAN OF CARE
Patient aox3, NSR, ra, lungs clear, IS 1750, flutter valve used, Cabg x2 POD3, Chest incision intact, left thigh incision intact. Walked 600 feet and tolerated it well . Washed up. Continue IS, flutter valve and walking. Possible dc on Friday.   Problem: Problem: GENITOURINARY - ADULT  Goal: Absence of urinary retention  Description  INTERVENTIONS:  - Assess patient’s ability to void and empty bladder  - Monitor intake/output and perform bladder scan as needed  - Follow urinary retention protocol/standar

## 2019-11-07 NOTE — PROGRESS NOTES
[facility]  Progress Note    Johana Edwards Patient Status:  Inpatient    1940 MRN [id number]   [facility] 8NE-A Attending Zhou Najera MD   [facility] Day # 8 PCP PHYSICIAN NONSTAFF     Subjective:  Johana Edwards is a(n) 70 y Recent Labs     11/04/19  1535 11/05/19  0411 11/06/19  0525 11/07/19  0539    139 137 138   K 4.7 4.6 4.6 4.3    108 102 103   CO2 26.0 24.0 29.0 30.0   BUN 29* 22* 25* 27*   CREATSERUM 1.05 1.06 0.98 0.92     Recent Labs   Lab 11/04/19  1 history of CHARLOTTE       Plan:  Plan to double check on O2 sats overnight  Instructed to increase bronchopulmonary toilet  Anticipate discharge in a.m. with pulmonary follow-up    CC     Alyssa Delgado MD  11/7/2019  11:54 AM

## 2019-11-07 NOTE — PROGRESS NOTES
[facility]  Cardiology Progress Note    Pako Bourne Patient Status:  Inpatient    1940 MRN [id number]   [facility] 8NE-A Attending Michael Peguero MD   Hosp Day # 8 PCP PHYSICIAN NONSTAFF     Subjective:  Up and walking tod spray Each Nare BID   • Insulin Aspart Pen  1-10 Units Subcutaneous TID AC and HS   • Pantoprazole Sodium  40 mg Oral QAM AC   • Losartan Potassium  25 mg Oral Daily   • aspirin EC  325 mg Oral Daily   • docusate sodium  100 mg Oral BID   • mupirocin  1 Ap for CABG.     Chest x-ray showed no congestion but chronic interstitial changes and multiple calcified small granulomas and COPD-like changes -heavy smoker total CABG time.   A tiny left pleural effusion and atelectasis at the left base.     Potassium 4.3

## 2019-11-07 NOTE — PHYSICAL THERAPY NOTE
PHYSICAL THERAPY TREATMENT NOTE - INPATIENT    Room Number: 1140/5209-Y     Session: 1   Number of Visits to Meet Established Goals: 3    Presenting Problem: CABG x2 vessels on 11/4/19  History related to current admission: Pt is 70year old male admitted Turning over in bed (including adjusting bedclothes, sheets and blankets)?: None   -   Sitting down on and standing up from a chair with arms (e.g., wheelchair, bedside commode, etc.): None   -   Moving from lying on back to sitting on the side of the bed? In bed;Needs met;Call light within reach;RN aware of session/findings    ASSESSMENT     The pt demonstrates good progress in functional mobility and activity tolerance. Pt verbalized sternal precautions.  CV exercises, gait training, stair training and bed

## 2019-11-07 NOTE — CARDIAC REHAB
Pt plans to go to cardiac rehab at [facility], closer to his home.  Gave contact info for [facility]  cardiac rehab to daughter Gaurang Murray, she will call and set up appt for patient

## 2019-11-08 VITALS
TEMPERATURE: 99 F | DIASTOLIC BLOOD PRESSURE: 49 MMHG | HEART RATE: 74 BPM | OXYGEN SATURATION: 94 % | SYSTOLIC BLOOD PRESSURE: 113 MMHG | BODY MASS INDEX: 26.23 KG/M2 | WEIGHT: 157.44 LBS | HEIGHT: 65 IN | RESPIRATION RATE: 20 BRPM

## 2019-11-08 PROCEDURE — 99239 HOSP IP/OBS DSCHRG MGMT >30: CPT | Performed by: INTERNAL MEDICINE

## 2019-11-08 PROCEDURE — 99232 SBSQ HOSP IP/OBS MODERATE 35: CPT | Performed by: NURSE PRACTITIONER

## 2019-11-08 RX ORDER — ATORVASTATIN CALCIUM 40 MG/1
40 TABLET, FILM COATED ORAL NIGHTLY
Qty: 30 TABLET | Refills: 11 | Status: SHIPPED | OUTPATIENT
Start: 2019-11-08

## 2019-11-08 RX ORDER — LOSARTAN POTASSIUM 25 MG/1
25 TABLET ORAL DAILY
Qty: 60 TABLET | Refills: 11 | Status: SHIPPED | OUTPATIENT
Start: 2019-11-09

## 2019-11-08 NOTE — PROGRESS NOTES
· [facility]      Subjective:  Up in chair family interpreting.   Feels good, ready for discharge    Objective:  100/48  Afebrile  SR 10 second PAT  I/O incomplete    Neuro: awake/alert  HEENT: no JVD  Cardiac: S1 S2 regular  Lungs: few rhonci

## 2019-11-08 NOTE — PROGRESS NOTES
[facility]  Progress Note    Silvestre Hammer Patient Status:  Inpatient    1940 MRN [id number]   [facility] 6NE-A Attending Charlette Garcia MD   [facility] Day # 9 PCP PHYSICIAN NONSTAFF     Subjective:  Silvestre Hammer is a(n) 70 yea 32.6 33.0   RDW 13.2 12.9 12.9   NEPRELIM 20.49* 11.76* 8.21*   WBC 23.3* 17.4* 13.7*   .0 332.0 324.0     Recent Labs   Lab 11/06/19  0525 11/07/19  0539 11/08/19  0527   * 120* 112*   BUN 25* 27* 27*   CREATSERUM 0.98 0.92 0.94   GFRAA 86 9

## 2019-11-08 NOTE — CM/SW NOTE
10:08am  MSW sent update in [facility] to [address] [place] [address] to expect dc today. MSW will send AVS when completed.     AVS Sent by MSLUCIA on 3:14pm

## 2019-11-08 NOTE — DISCHARGE PLANNING
Discharge     Patient cleared for discharge by all services. Discharge education and handout provided to patient and family. Follow-up appointments reviewed. Medications reviewed. All questions answered. Patient and family verbalized understanding.

## 2019-11-08 NOTE — PLAN OF CARE
Pt. Alert and o x 4 , Icelandic speaking only , family always at bedside to interpret. S/P CABG x 2 POD # 3 ; post procedural instructions provided , verbalized understanding. Sternal incision , stable with SS , x 1 harvest site to left leg.  Lane Burgos Continuous cardiac monitoring, monitor vital signs, obtain 12 lead EKG if indicated  - Evaluate effectiveness of antiarrhythmic and heart rate control medications as ordered  - Initiate emergency measures for life threatening arrhythmias  - Monitor electro Progressing     Problem: Impaired Functional Mobility  Goal: Achieve highest/safest level of mobility/gait  Description  Interventions:  - Assess patient's functional ability and stability  - Promote increasing activity/tolerance for mobility and gait  - E

## 2019-11-08 NOTE — PLAN OF CARE
POD #  4 s/p CABG x2. A&O X 4. Calm, cooperative. VSS. NSR on cardiac monitor. . Adequate saturation on RA. Saturates low to mid 90's on RA. Lungs clear. Occasional cough with sputum. Mucinex given. IS encouraged. Achieving 1000ml. Denied SOB.   Denver Gray Absence of cardiac arrhythmias or at baseline  Description  INTERVENTIONS:  - Continuous cardiac monitoring, monitor vital signs, obtain 12 lead EKG if indicated  - Evaluate effectiveness of antiarrhythmic and heart rate control medications as ordered  - I if applicable  - Encourage broncho-pulmonary hygiene including cough, deep breathe, Incentive Spirometry  - Assess the need for suctioning and perform as needed  - Assess and instruct to report SOB or any respiratory difficulty  - Respiratory Therapy suppo

## 2019-11-08 NOTE — PAYOR COMM NOTE
--------------  CONTINUED STAY REVIEW    Payor: [address] #:  [id number]  Authorization Number: [id number]    Admit date: 10/30/19  Admit time: [address]    Admitting Physician: Negra Diallo MD  Attending Physician:  Armando Hall 1.   New onset Acute CHF  Systolic   1. ECHO  LVEF 40-45%  + WMA   2. cabg  X2 RCA/ OM w/ SVG  11/4   3. Cards/ CVS following  4. Pain control    5. Ambulate IS/ flutter valve    6. ASA, BB cozaar, statin      2. CAD -   S/p CABG x2   11/4   1.  Cards/ CV in his throat, overall better. No fevers.   No significant desaturation overnight and remained on RA     Objective:  Vitals     11/07/19  2020 11/08/19  0036 11/08/19  0602 11/08/19  0835   BP: 97/51 98/53 97/50 100/48   BP Location: Left arm Left arm Left Lab 11/06/19  0525 11/07/19  0539 11/08/19  0527   * 120* 112*   BUN 25* 27* 27*   CREATSERUM 0.98 0.92 0.94   GFRAA 86 92 90   GFRNAA 74 80 78   CA 8.5 8.6 8.4*    138 139   K 4.6 4.3 4.2    103 105   CO2 29.0 30.0 30.0          Recen spray Each Nare Claudette Yoo RN    11/7/2019 2018 Given 1 spray Each Nare Celina PARRISH RN      guaiFENesin ER (MUCINEX) 12 hr tab 600 mg     Date Action Dose Route User    11/8/2019 0836 Given 600 mg Oral Solis Amaya, RN    11/7/2019 2018 Give Sodium (PROTONIX) EC tab 40 mg     Date Action Dose Route User    11/8/2019 0604 Given 40 mg Oral Bobby Solares RN

## 2019-11-08 NOTE — PROGRESS NOTES
[facility]  Progress Note     Beltran Woodard Patient Status:  Inpatient    1940 MRN [id number]   [facility] 2NE-A Attending Shima Bates MD   Hosp Day # 9 PCP PHYSICIAN NONSTAFF     Chief Complaint: abd pain, SOB  CAD fo 3. Cards/ CVS following  4. Pain control    5. Ambulate IS/ flutter valve    6. ASA, BB cozaar, statin     2. CAD -   S/p CABG x2   11/4   1. Cards/ CVS   to follow  2. pulm following       2. COPD   Inhalers per pulm,    3.  Pleural effusion-preop    4

## 2019-11-08 NOTE — OCCUPATIONAL THERAPY NOTE
OCCUPATIONAL THERAPY TREATMENT NOTE - INPATIENT     Room Number: 0571/7351-F  Session: 1   Number of Visits to Meet Established Goals: 5    Presenting Problem: CABG    History related to current admission: Pt is a 70year old male admit on 10/29/2019 for C teeth?: None  -   Eating meals?: None    AM-PAC Score:  Score: 24  Approx Degree of Impairment: 0%  Standardized Score (AM-PAC Scale): 57.54  CMS Modifier (G-Code): CH    FUNCTIONAL TRANSFER ASSESSMENT  Supine to Sit : Not tested  Sit to Stand: Independent exercise program). -met     Additional Goals:  Pt will verbalize knowledge of sternal precautions independently-met  Pt will demonstrate ability to follow sternal precautions with independently-met  Pt will complete all ADLs and IADLs while following sterna

## 2019-11-08 NOTE — PROGRESS NOTES
[facility]  CV Surgery Progress Note    Evan Cove Patient Status:  Inpatient    1940 MRN [id number]   [facility] 6NE-A Attending Meryl Jain MD   Hosp Day # 9 PCP PHYSICIAN NONSTAFF     Subjective:  Patient seen this AM IS/Ambulation   -Discharge planning: ok for d/c today if ok with consultants       Ramsey Rosenbaum PA-C/Ralf  11/8/2019  7:35 AM

## 2019-11-08 NOTE — PROGRESS NOTES
[facility]  Progress Note    Evanselena Chavez Patient Status:  Inpatient    1940 MRN [id number]   [facility] 8NE-A Attending Cyntha Leventhal, MD   Hosp Day # 5 PCP PHYSICIAN NONSTAFF       Assessment/Plan: 69 yo s/p CABG    1.  Hy AM   Result Value Ref Range    Glucose 112 (H) 70 - 99 mg/dL    Sodium 139 136 - 145 mmol/L    Potassium 4.2 3.5 - 5.1 mmol/L    Chloride 105 98 - 112 mmol/L    CO2 30.0 21.0 - 32.0 mmol/L    Anion Gap 4 0 - 18 mmol/L    BUN 27 (H) 7 - 18 mg/dL    Creatini

## 2019-11-09 NOTE — DISCHARGE SUMMARY
[facility]  DISCHARGE SUMMARY     Kerry Wisdom Patient Status:  Inpatient    1940 MRN [id number]   [facility] 8NE-A Attending No att. providers found   Hosp Day # 9 PCP PHYSICIAN NONSTAFF     Date of Admission: 10/29/2019 pain, he recently underwent an upper endoscopy at an outside hospital which revealed some gastritis and a small hiatal hernia but abdominal pain recurred so he presented to the hospital.  Patient primarily Zambian-speaking but does not appear to complain o pressors extubated and de-lined and normal timeframe. He did not need any blood this admission. He is maintaining normal sinus rhythm needed minimal diuresing.   Patient has been weaned off oxygen, there is a possibility of CHARLOTTE will need outpatient sleep angiographically. 5.     LAD artery with mild disease in proximal segment and otherwise no stenosis angiographically although very tortuous due to uncontrolled hypertension.   6.     Well-developed collaterals from LAD artery through septal branches to dis lymphadenopathy and right hilar lymphadenopathy may be reactive, however continued follow-up is suggested. Abnormal enhancement of the right kidney with wedge-shaped areas of hypoattenuation. This may represent infarct or pyelonephritis.   There is also patient:  5 am and 5 pm  Or  8 am and 8 pm  Etc. Take 1 tablet (25 mg total) by mouth 2x Daily(Beta Blocker). Quantity:  60 tablet  Refills:  11     nicotine 14 MG/24HR Pt24  Commonly known as:  NICODERM CQ      Place 1 patch onto the skin daily. chest xray and sleep study prior to appointment      Vital signs:  Temp:  [98.6 °F (37 °C)] 98.6 °F (37 °C)  Pulse:  [74] 74  Resp:  [20] 20  BP: (113)/(49) 113/49    Physical Exam:    General: No acute distress.  Sleeping    Respiratory: good   inspiratory

## 2019-11-11 ENCOUNTER — PATIENT OUTREACH (OUTPATIENT)
Dept: CASE MANAGEMENT | Age: 79
End: 2019-11-11

## 2019-11-11 DIAGNOSIS — Z02.9 ENCOUNTERS FOR UNSPECIFIED ADMINISTRATIVE PURPOSE: ICD-10-CM

## 2019-11-11 NOTE — PROGRESS NOTES
Tried to call the pt for TCM, phone rang multiple times and no answer. Call did not go to a . [facility] to try again at a later time. TCC ordered at d/c, pt has an appt at [facility] on 11/13/19.

## 2019-11-12 NOTE — PROGRESS NOTES
Call placed with the assistance of Kajal Oconnell # [id number], LM requesting a call back with a condition update and also requsting a call to the [address] [place] [address] at [phone] to confirm appointment on 11/13/19 at 8:00 am.

## 2019-11-13 ENCOUNTER — OFFICE VISIT (OUTPATIENT)
Dept: INTERNAL MEDICINE CLINIC | Facility: CLINIC | Age: 79
End: 2019-11-13
Payer: MEDICARE

## 2019-11-13 VITALS
DIASTOLIC BLOOD PRESSURE: 48 MMHG | RESPIRATION RATE: 18 BRPM | SYSTOLIC BLOOD PRESSURE: 98 MMHG | BODY MASS INDEX: 26.99 KG/M2 | TEMPERATURE: 100 F | HEART RATE: 68 BPM | WEIGHT: 162 LBS | OXYGEN SATURATION: 98 % | HEIGHT: 65 IN

## 2019-11-13 DIAGNOSIS — F17.200 SMOKER: ICD-10-CM

## 2019-11-13 DIAGNOSIS — N13.5 UPJ (URETEROPELVIC JUNCTION) OBSTRUCTION: ICD-10-CM

## 2019-11-13 DIAGNOSIS — I25.110 CORONARY ARTERY DISEASE INVOLVING NATIVE CORONARY ARTERY OF NATIVE HEART WITH UNSTABLE ANGINA PECTORIS (HCC): Primary | ICD-10-CM

## 2019-11-13 DIAGNOSIS — N40.0 BENIGN PROSTATIC HYPERPLASIA, UNSPECIFIED WHETHER LOWER URINARY TRACT SYMPTOMS PRESENT: ICD-10-CM

## 2019-11-13 DIAGNOSIS — I50.21 ACUTE SYSTOLIC HEART FAILURE (HCC): ICD-10-CM

## 2019-11-13 DIAGNOSIS — D72.829 LEUKOCYTOSIS, UNSPECIFIED TYPE: ICD-10-CM

## 2019-11-13 DIAGNOSIS — E78.5 DYSLIPIDEMIA: ICD-10-CM

## 2019-11-13 DIAGNOSIS — Z95.1 S/P CABG X 2: ICD-10-CM

## 2019-11-13 DIAGNOSIS — I10 ESSENTIAL HYPERTENSION: ICD-10-CM

## 2019-11-13 PROCEDURE — 99495 TRANSJ CARE MGMT MOD F2F 14D: CPT | Performed by: CLINICAL NURSE SPECIALIST

## 2019-11-13 PROCEDURE — 1111F DSCHRG MED/CURRENT MED MERGE: CPT | Performed by: CLINICAL NURSE SPECIALIST

## 2019-11-13 PROCEDURE — 85025 COMPLETE CBC W/AUTO DIFF WBC: CPT | Performed by: CLINICAL NURSE SPECIALIST

## 2019-11-13 NOTE — PROGRESS NOTES
Hilary Caceres 6      HISTORY   CHIEF COMPLAINT: post hospital follow up visit  HPI: Tyler Cook is a 70year old male here today for follow up after being hospitalized for abdominal pain and SOB.  Tyler Cook tablet, Rfl: 11  HYDROcodone-acetaminophen (NORCO) 5-325 MG Oral Tab, Take 1-2 tablets by mouth every 4 to 6 hours as needed. , Disp: 60 tablet, Rfl: 0  nicotine 14 MG/24HR Transdermal Patch 24 Hr, Place 1 patch onto the skin daily. , Disp: 28 patch, Rfl: 1 on 11/05/2019 at 6:57     Approved by: Luke Magaña MD on 11/05/2019 at 6:58          Xr Chest Ap Portable  (cpt=71045)    Result Date: 11/4/2019  CONCLUSION:    Endotracheal tube 3.5 cm above the carinal, [place] catheter tip in the main pulmonary outflow 01:04 PM         There is no immunization history on file for this patient.     ROS:  GENERAL: weight stable, energy stable, denies fever, + weakness  RESPIRATORY: denies cough, denies dyspnea with exertion  CARDIOVASCULAR: denies syncope, chest pain, palpi PFTs  · Follow up with pulm:  patient instructed to speak with [facility] staff about referral; insurance not accepted by [facility] or [facility]    8.  Leukocytosis  · CBC today; WBC trending down, H/H trending up      9.   Elevated A1C/Stress Induced Hyperglycemia  · A1C=6.2 living, and activities of daily living. ? Referrals as listed below in orders Assisted in scheduling required follow-up with community providers and services. Medication Reconciliation:  I am aware of an inpatient discharge within the last 30 days.   Vazquez Paula

## 2019-11-13 NOTE — PROGRESS NOTES
[facility] PHARMACIST MEDICATION RECONCILIATION        Keyur Morrow MRN [id number]    1940 PCP PHYSICIAN NONSTAFF       Comments: Medication history completed in [address] [place] [address] by pharmacist with patient and family rao PharmD, 11/13/2019, 8:18 AM  [facility]

## 2019-11-13 NOTE — PATIENT INSTRUCTIONS
Patient Instructions:  1.   The following recommendations have been made by the specialists who saw you in the hospital:  · Urology - you will need a cystoscopy and CT urogram for a ureteropelvic junction obstruction/possible renal infarct  · Pulmonology -

## 2019-11-14 ENCOUNTER — TELEPHONE (OUTPATIENT)
Dept: INTERNAL MEDICINE CLINIC | Facility: CLINIC | Age: 79
End: 2019-11-14

## 2019-11-14 RX ORDER — MEDICAL SUPPLY, MISCELLANEOUS
EACH MISCELLANEOUS
Qty: 1 EACH | Refills: 0 | Status: SHIPPED | OUTPATIENT
Start: 2019-11-14

## 2019-11-14 NOTE — TELEPHONE ENCOUNTER
Received call about request for BP machine script for insurance coverage of BP machine. Order entered.   Sho Naranjo, LAKIA

## 2019-11-18 ENCOUNTER — APPOINTMENT (OUTPATIENT)
Dept: GENERAL RADIOLOGY | Facility: HOSPITAL | Age: 79
End: 2019-11-18
Attending: THORACIC SURGERY (CARDIOTHORACIC VASCULAR SURGERY)
Payer: MEDICARE

## 2019-11-18 ENCOUNTER — OFFICE VISIT (OUTPATIENT)
Dept: CARDIOLOGY | Age: 79
End: 2019-11-18

## 2019-11-18 ENCOUNTER — HOSPITAL ENCOUNTER (OUTPATIENT)
Dept: GENERAL RADIOLOGY | Facility: HOSPITAL | Age: 79
Discharge: HOME | End: 2019-11-18
Attending: THORACIC SURGERY (CARDIOTHORACIC VASCULAR SURGERY)
Payer: MEDICARE

## 2019-11-18 ENCOUNTER — HOSPITAL ENCOUNTER (OUTPATIENT)
Dept: GENERAL RADIOLOGY | Facility: HOSPITAL | Age: 79
Discharge: HOME | End: 2019-11-18
Attending: INTERNAL MEDICINE
Payer: MEDICARE

## 2019-11-18 VITALS
DIASTOLIC BLOOD PRESSURE: 50 MMHG | SYSTOLIC BLOOD PRESSURE: 90 MMHG | HEART RATE: 76 BPM | WEIGHT: 158 LBS | HEIGHT: 67 IN | BODY MASS INDEX: 24.8 KG/M2

## 2019-11-18 DIAGNOSIS — E78.5 DYSLIPIDEMIA: ICD-10-CM

## 2019-11-18 DIAGNOSIS — Z95.1 S/P CABG (CORONARY ARTERY BYPASS GRAFT): ICD-10-CM

## 2019-11-18 DIAGNOSIS — R07.9 CHEST PAIN, UNSPECIFIED TYPE: ICD-10-CM

## 2019-11-18 DIAGNOSIS — J44.9 COPD WITHOUT EXACERBATION (CMD): ICD-10-CM

## 2019-11-18 DIAGNOSIS — J18.9 COMMUNITY ACQUIRED PNEUMONIA, UNSPECIFIED LATERALITY: ICD-10-CM

## 2019-11-18 DIAGNOSIS — I25.5 ISCHEMIC CARDIOMYOPATHY: Primary | ICD-10-CM

## 2019-11-18 DIAGNOSIS — Z87.74 S/P SURGERY FOR COMPLEX CONGENITAL HEART DISEASE: ICD-10-CM

## 2019-11-18 DIAGNOSIS — I10 ESSENTIAL HYPERTENSION: ICD-10-CM

## 2019-11-18 DIAGNOSIS — I25.10 CORONARY ARTERY DISEASE INVOLVING NATIVE CORONARY ARTERY OF NATIVE HEART WITHOUT ANGINA PECTORIS: ICD-10-CM

## 2019-11-18 PROCEDURE — 3078F DIAST BP <80 MM HG: CPT | Performed by: NURSE PRACTITIONER

## 2019-11-18 PROCEDURE — 99204 OFFICE O/P NEW MOD 45 MIN: CPT | Performed by: NURSE PRACTITIONER

## 2019-11-18 PROCEDURE — 3074F SYST BP LT 130 MM HG: CPT | Performed by: NURSE PRACTITIONER

## 2019-11-18 PROCEDURE — 71048 X-RAY EXAM CHEST 4+ VIEWS: CPT | Performed by: THORACIC SURGERY (CARDIOTHORACIC VASCULAR SURGERY)

## 2019-11-18 RX ORDER — LOSARTAN POTASSIUM 25 MG/1
0.5 TABLET ORAL DAILY
COMMUNITY
End: 2019-11-18 | Stop reason: SDUPTHER

## 2019-11-18 RX ORDER — ATORVASTATIN CALCIUM 40 MG/1
40 TABLET, FILM COATED ORAL AT BEDTIME
COMMUNITY
End: 2019-12-12 | Stop reason: SDUPTHER

## 2019-11-18 RX ORDER — ATORVASTATIN CALCIUM 40 MG/1
40 TABLET, FILM COATED ORAL DAILY
COMMUNITY
End: 2019-11-18

## 2019-11-18 RX ORDER — ASPIRIN 325 MG
325 TABLET ORAL DAILY
COMMUNITY
End: 2020-11-17 | Stop reason: SDUPTHER

## 2019-11-18 RX ORDER — PANTOPRAZOLE SODIUM 40 MG/1
40 TABLET, DELAYED RELEASE ORAL DAILY
COMMUNITY
End: 2019-12-12 | Stop reason: SDUPTHER

## 2019-11-18 RX ORDER — LOSARTAN POTASSIUM 25 MG/1
25 TABLET ORAL DAILY
COMMUNITY
End: 2019-11-18 | Stop reason: DRUGHIGH

## 2019-11-18 RX ORDER — HYDROCODONE BITARTRATE AND ACETAMINOPHEN 5; 325 MG/1; MG/1
TABLET ORAL
Refills: 0 | COMMUNITY
Start: 2019-11-08 | End: 2020-01-13 | Stop reason: ALTCHOICE

## 2019-11-18 RX ORDER — LOSARTAN POTASSIUM 25 MG/1
TABLET ORAL
Qty: 30 TABLET | Refills: 5 | Status: SHIPPED | OUTPATIENT
Start: 2019-11-18 | End: 2019-12-12 | Stop reason: SDUPTHER

## 2019-11-18 RX ORDER — NICOTINE 21 MG/24HR
1 PATCH, TRANSDERMAL 24 HOURS TRANSDERMAL EVERY 24 HOURS
COMMUNITY
End: 2020-11-17 | Stop reason: CLARIF

## 2019-11-18 SDOH — HEALTH STABILITY: PHYSICAL HEALTH: ON AVERAGE, HOW MANY DAYS PER WEEK DO YOU ENGAGE IN MODERATE TO STRENUOUS EXERCISE (LIKE A BRISK WALK)?: 2 DAYS

## 2019-11-18 ASSESSMENT — ENCOUNTER SYMPTOMS
DECREASED APPETITE: 0
SYNCOPE: 0
NEUROLOGICAL NEGATIVE: 1
SHORTNESS OF BREATH: 0
WEIGHT LOSS: 0
GASTROINTESTINAL NEGATIVE: 1
WEIGHT GAIN: 0
DIAPHORESIS: 1

## 2019-11-18 ASSESSMENT — PATIENT HEALTH QUESTIONNAIRE - PHQ9
SUM OF ALL RESPONSES TO PHQ9 QUESTIONS 1 AND 2: 0
2. FEELING DOWN, DEPRESSED OR HOPELESS: NOT AT ALL
1. LITTLE INTEREST OR PLEASURE IN DOING THINGS: NOT AT ALL
SUM OF ALL RESPONSES TO PHQ9 QUESTIONS 1 AND 2: 0

## 2019-11-19 ENCOUNTER — TELEPHONE (OUTPATIENT)
Dept: INTERVENTIONAL RADIOLOGY/VASCULAR | Facility: HOSPITAL | Age: 79
End: 2019-11-19

## 2019-11-19 ENCOUNTER — TELEPHONE (OUTPATIENT)
Dept: CARDIOLOGY | Age: 79
End: 2019-11-19

## 2019-11-19 NOTE — TELEPHONE ENCOUNTER
CXR reviewed- no ptx or pleural effusions, stable granulomas. Left VM to call back for non urgent results.

## 2019-11-20 NOTE — TELEPHONE ENCOUNTER
Tried calling again. No answer. Left VM to call back for CXR results, non urgent nd reminded patient to follow up on 12/4.

## 2019-11-26 ENCOUNTER — TELEPHONE (OUTPATIENT)
Dept: CARDIOLOGY | Age: 79
End: 2019-11-26

## 2019-11-26 NOTE — CDS QUERY
Uncertain Diagnosis  CLINICAL DOCUMENTATION CLARIFICATION FORM  Dear Doctor Rossana Dee information (provided below) indicates an unknown status of a documented diagnosis.  For accurate ICD-10-CM code assignment to reflect severity of illness and r If you have any questions, please contact Clinical : Christy Nicole RN/ [phone]  Thank You!   THIS FORM IS A PERMANENT PART OF THE MEDICAL RECORD

## 2019-12-04 ENCOUNTER — LAB ENCOUNTER (OUTPATIENT)
Dept: LAB | Facility: HOSPITAL | Age: 79
End: 2019-12-04
Attending: STUDENT IN AN ORGANIZED HEALTH CARE EDUCATION/TRAINING PROGRAM
Payer: MEDICARE

## 2019-12-04 DIAGNOSIS — E78.5 HYPERLIPEMIA: Primary | ICD-10-CM

## 2019-12-04 DIAGNOSIS — Z95.1 S/P CABG (CORONARY ARTERY BYPASS GRAFT): ICD-10-CM

## 2019-12-04 PROCEDURE — 80061 LIPID PANEL: CPT

## 2019-12-04 PROCEDURE — 36415 COLL VENOUS BLD VENIPUNCTURE: CPT

## 2019-12-04 PROCEDURE — 80076 HEPATIC FUNCTION PANEL: CPT

## 2019-12-12 ENCOUNTER — TELEPHONE (OUTPATIENT)
Dept: CARDIOLOGY | Age: 79
End: 2019-12-12

## 2019-12-12 ENCOUNTER — HOSPITAL ENCOUNTER (OUTPATIENT)
Dept: CV DIAGNOSTICS | Facility: HOSPITAL | Age: 79
Discharge: HOME | End: 2019-12-12
Attending: NURSE PRACTITIONER
Payer: MEDICARE

## 2019-12-12 DIAGNOSIS — I25.10 CORONARY ARTERY DISEASE INVOLVING NATIVE CORONARY ARTERY OF NATIVE HEART WITHOUT ANGINA PECTORIS: ICD-10-CM

## 2019-12-12 DIAGNOSIS — Z95.1 S/P CABG (CORONARY ARTERY BYPASS GRAFT): ICD-10-CM

## 2019-12-12 PROCEDURE — 93017 CV STRESS TEST TRACING ONLY: CPT | Performed by: NURSE PRACTITIONER

## 2019-12-12 PROCEDURE — 93018 CV STRESS TEST I&R ONLY: CPT | Performed by: NURSE PRACTITIONER

## 2019-12-12 RX ORDER — ATORVASTATIN CALCIUM 40 MG/1
40 TABLET, FILM COATED ORAL AT BEDTIME
Qty: 30 TABLET | Refills: 6 | Status: SHIPPED | OUTPATIENT
Start: 2019-12-12 | End: 2020-10-22 | Stop reason: SDUPTHER

## 2019-12-12 RX ORDER — PANTOPRAZOLE SODIUM 40 MG/1
40 TABLET, DELAYED RELEASE ORAL DAILY
Qty: 30 TABLET | Refills: 5 | Status: SHIPPED | OUTPATIENT
Start: 2019-12-12 | End: 2020-11-17 | Stop reason: CLARIF

## 2019-12-12 RX ORDER — LOSARTAN POTASSIUM 25 MG/1
12.5 TABLET ORAL DAILY
Qty: 30 TABLET | Refills: 5 | Status: SHIPPED | OUTPATIENT
Start: 2019-12-12 | End: 2021-05-25 | Stop reason: SDUPTHER

## 2019-12-12 NOTE — PROGRESS NOTES
Patient in outpatient cardiodiagnostics for pre-rehab treadmill stress test.  Had CABG 11/4/19. Daughter translating for patient who speaks Macedonian. Daughter reported patient ran out of all of his medications 2 days ago and isn't taking any.   He is not piedra

## 2019-12-16 ENCOUNTER — TELEPHONE (OUTPATIENT)
Dept: CARDIOLOGY | Age: 79
End: 2019-12-16

## 2019-12-17 ENCOUNTER — CARDPULM VISIT (OUTPATIENT)
Dept: CARDIAC REHAB | Facility: HOSPITAL | Age: 79
End: 2019-12-17
Attending: INTERNAL MEDICINE
Payer: MEDICARE

## 2019-12-17 PROCEDURE — 93798 PHYS/QHP OP CAR RHAB W/ECG: CPT

## 2019-12-17 RX ORDER — PANTOPRAZOLE SODIUM 40 MG/1
40 TABLET, DELAYED RELEASE ORAL
COMMUNITY

## 2019-12-18 ENCOUNTER — CARDPULM VISIT (OUTPATIENT)
Dept: CARDIAC REHAB | Facility: HOSPITAL | Age: 79
End: 2019-12-18
Attending: INTERNAL MEDICINE
Payer: MEDICARE

## 2019-12-18 PROCEDURE — 93798 PHYS/QHP OP CAR RHAB W/ECG: CPT

## 2019-12-20 ENCOUNTER — CARDPULM VISIT (OUTPATIENT)
Dept: CARDIAC REHAB | Facility: HOSPITAL | Age: 79
End: 2019-12-20
Attending: INTERNAL MEDICINE
Payer: MEDICARE

## 2019-12-20 PROCEDURE — 93798 PHYS/QHP OP CAR RHAB W/ECG: CPT

## 2019-12-21 NOTE — PROGRESS NOTES
Multiple attempts to reach the pt and messages left with no returned phone call. Pt went to HFU with [facility] on 11/13/19. Closing encounter.

## 2019-12-23 ENCOUNTER — CARDPULM VISIT (OUTPATIENT)
Dept: CARDIAC REHAB | Facility: HOSPITAL | Age: 79
End: 2019-12-23
Attending: INTERNAL MEDICINE
Payer: MEDICARE

## 2019-12-23 DIAGNOSIS — Z95.1 S/P CABG (CORONARY ARTERY BYPASS GRAFT): ICD-10-CM

## 2019-12-23 DIAGNOSIS — I25.10 CORONARY ARTERY DISEASE INVOLVING NATIVE CORONARY ARTERY OF NATIVE HEART WITHOUT ANGINA PECTORIS: ICD-10-CM

## 2019-12-23 PROCEDURE — 93798 PHYS/QHP OP CAR RHAB W/ECG: CPT

## 2019-12-27 ENCOUNTER — CARDPULM VISIT (OUTPATIENT)
Dept: CARDIAC REHAB | Facility: HOSPITAL | Age: 79
End: 2019-12-27
Attending: INTERNAL MEDICINE
Payer: MEDICARE

## 2019-12-27 PROCEDURE — 93798 PHYS/QHP OP CAR RHAB W/ECG: CPT

## 2019-12-30 ENCOUNTER — CARDPULM VISIT (OUTPATIENT)
Dept: CARDIAC REHAB | Facility: HOSPITAL | Age: 79
End: 2019-12-30
Attending: INTERNAL MEDICINE
Payer: MEDICARE

## 2019-12-30 PROCEDURE — 93798 PHYS/QHP OP CAR RHAB W/ECG: CPT

## 2020-01-01 ENCOUNTER — EXTERNAL RECORD (OUTPATIENT)
Dept: HEALTH INFORMATION MANAGEMENT | Facility: OTHER | Age: 80
End: 2020-01-01

## 2020-01-03 ENCOUNTER — CARDPULM VISIT (OUTPATIENT)
Dept: CARDIAC REHAB | Facility: HOSPITAL | Age: 80
End: 2020-01-03
Attending: INTERNAL MEDICINE
Payer: MEDICARE

## 2020-01-03 PROCEDURE — 93798 PHYS/QHP OP CAR RHAB W/ECG: CPT

## 2020-01-06 ENCOUNTER — CARDPULM VISIT (OUTPATIENT)
Dept: CARDIAC REHAB | Facility: HOSPITAL | Age: 80
End: 2020-01-06
Attending: INTERNAL MEDICINE
Payer: MEDICARE

## 2020-01-06 PROCEDURE — 93798 PHYS/QHP OP CAR RHAB W/ECG: CPT

## 2020-01-08 ENCOUNTER — CARDPULM VISIT (OUTPATIENT)
Dept: CARDIAC REHAB | Facility: HOSPITAL | Age: 80
End: 2020-01-08
Attending: INTERNAL MEDICINE
Payer: MEDICARE

## 2020-01-08 PROCEDURE — 93798 PHYS/QHP OP CAR RHAB W/ECG: CPT

## 2020-01-10 ENCOUNTER — CARDPULM VISIT (OUTPATIENT)
Dept: CARDIAC REHAB | Facility: HOSPITAL | Age: 80
End: 2020-01-10
Attending: INTERNAL MEDICINE
Payer: MEDICARE

## 2020-01-10 PROCEDURE — 93798 PHYS/QHP OP CAR RHAB W/ECG: CPT

## 2020-01-13 ENCOUNTER — CARDPULM VISIT (OUTPATIENT)
Dept: CARDIAC REHAB | Facility: HOSPITAL | Age: 80
End: 2020-01-13
Attending: INTERNAL MEDICINE
Payer: MEDICARE

## 2020-01-13 PROCEDURE — 93798 PHYS/QHP OP CAR RHAB W/ECG: CPT

## 2020-01-14 ENCOUNTER — OFFICE VISIT (OUTPATIENT)
Dept: CARDIOLOGY | Age: 80
End: 2020-01-14

## 2020-01-14 ENCOUNTER — TELEPHONE (OUTPATIENT)
Dept: CARDIOLOGY | Age: 80
End: 2020-01-14

## 2020-01-14 VITALS
DIASTOLIC BLOOD PRESSURE: 58 MMHG | HEIGHT: 67 IN | SYSTOLIC BLOOD PRESSURE: 128 MMHG | WEIGHT: 177 LBS | BODY MASS INDEX: 27.78 KG/M2 | HEART RATE: 60 BPM

## 2020-01-14 DIAGNOSIS — I10 ESSENTIAL HYPERTENSION: ICD-10-CM

## 2020-01-14 DIAGNOSIS — E78.5 DYSLIPIDEMIA: ICD-10-CM

## 2020-01-14 DIAGNOSIS — I25.5 ISCHEMIC CARDIOMYOPATHY: ICD-10-CM

## 2020-01-14 DIAGNOSIS — I25.10 CORONARY ARTERY DISEASE INVOLVING NATIVE CORONARY ARTERY OF NATIVE HEART WITHOUT ANGINA PECTORIS: Primary | ICD-10-CM

## 2020-01-14 DIAGNOSIS — Z95.1 S/P CABG (CORONARY ARTERY BYPASS GRAFT): ICD-10-CM

## 2020-01-14 PROCEDURE — 99215 OFFICE O/P EST HI 40 MIN: CPT | Performed by: INTERNAL MEDICINE

## 2020-01-14 PROCEDURE — 3078F DIAST BP <80 MM HG: CPT | Performed by: INTERNAL MEDICINE

## 2020-01-14 PROCEDURE — 3074F SYST BP LT 130 MM HG: CPT | Performed by: INTERNAL MEDICINE

## 2020-01-14 SDOH — HEALTH STABILITY: PHYSICAL HEALTH: ON AVERAGE, HOW MANY MINUTES DO YOU ENGAGE IN EXERCISE AT THIS LEVEL?: 60 MIN

## 2020-01-14 SDOH — HEALTH STABILITY: PHYSICAL HEALTH: ON AVERAGE, HOW MANY DAYS PER WEEK DO YOU ENGAGE IN MODERATE TO STRENUOUS EXERCISE (LIKE A BRISK WALK)?: 3 DAYS

## 2020-01-15 ENCOUNTER — CARDPULM VISIT (OUTPATIENT)
Dept: CARDIAC REHAB | Facility: HOSPITAL | Age: 80
End: 2020-01-15
Attending: INTERNAL MEDICINE
Payer: MEDICARE

## 2020-01-15 PROCEDURE — 93798 PHYS/QHP OP CAR RHAB W/ECG: CPT

## 2020-01-17 ENCOUNTER — CARDPULM VISIT (OUTPATIENT)
Dept: CARDIAC REHAB | Facility: HOSPITAL | Age: 80
End: 2020-01-17
Attending: INTERNAL MEDICINE
Payer: MEDICARE

## 2020-01-17 PROCEDURE — 93798 PHYS/QHP OP CAR RHAB W/ECG: CPT

## 2020-01-20 ENCOUNTER — CARDPULM VISIT (OUTPATIENT)
Dept: CARDIAC REHAB | Facility: HOSPITAL | Age: 80
End: 2020-01-20
Attending: INTERNAL MEDICINE
Payer: MEDICARE

## 2020-01-20 PROCEDURE — 93798 PHYS/QHP OP CAR RHAB W/ECG: CPT

## 2020-01-22 ENCOUNTER — CARDPULM VISIT (OUTPATIENT)
Dept: CARDIAC REHAB | Facility: HOSPITAL | Age: 80
End: 2020-01-22
Attending: INTERNAL MEDICINE
Payer: MEDICARE

## 2020-01-22 PROCEDURE — 93798 PHYS/QHP OP CAR RHAB W/ECG: CPT

## 2020-01-24 ENCOUNTER — CARDPULM VISIT (OUTPATIENT)
Dept: CARDIAC REHAB | Facility: HOSPITAL | Age: 80
End: 2020-01-24
Attending: INTERNAL MEDICINE
Payer: MEDICARE

## 2020-01-24 PROCEDURE — 93798 PHYS/QHP OP CAR RHAB W/ECG: CPT

## 2020-01-27 ENCOUNTER — CARDPULM VISIT (OUTPATIENT)
Dept: CARDIAC REHAB | Facility: HOSPITAL | Age: 80
End: 2020-01-27
Attending: INTERNAL MEDICINE
Payer: MEDICARE

## 2020-01-27 PROCEDURE — 93798 PHYS/QHP OP CAR RHAB W/ECG: CPT

## 2020-01-29 ENCOUNTER — CARDPULM VISIT (OUTPATIENT)
Dept: CARDIAC REHAB | Facility: HOSPITAL | Age: 80
End: 2020-01-29
Attending: INTERNAL MEDICINE
Payer: MEDICARE

## 2020-01-29 PROCEDURE — 93798 PHYS/QHP OP CAR RHAB W/ECG: CPT

## 2020-01-31 ENCOUNTER — CARDPULM VISIT (OUTPATIENT)
Dept: CARDIAC REHAB | Facility: HOSPITAL | Age: 80
End: 2020-01-31
Attending: INTERNAL MEDICINE
Payer: MEDICARE

## 2020-01-31 PROCEDURE — 93798 PHYS/QHP OP CAR RHAB W/ECG: CPT

## 2020-02-03 ENCOUNTER — CARDPULM VISIT (OUTPATIENT)
Dept: CARDIAC REHAB | Facility: HOSPITAL | Age: 80
End: 2020-02-03
Attending: INTERNAL MEDICINE
Payer: MEDICARE

## 2020-02-03 PROCEDURE — 93798 PHYS/QHP OP CAR RHAB W/ECG: CPT

## 2020-02-05 ENCOUNTER — CARDPULM VISIT (OUTPATIENT)
Dept: CARDIAC REHAB | Facility: HOSPITAL | Age: 80
End: 2020-02-05
Attending: INTERNAL MEDICINE
Payer: MEDICARE

## 2020-02-05 PROCEDURE — 93798 PHYS/QHP OP CAR RHAB W/ECG: CPT

## 2020-02-07 ENCOUNTER — CARDPULM VISIT (OUTPATIENT)
Dept: CARDIAC REHAB | Facility: HOSPITAL | Age: 80
End: 2020-02-07
Attending: INTERNAL MEDICINE
Payer: MEDICARE

## 2020-02-07 ENCOUNTER — HOSPITAL ENCOUNTER (OUTPATIENT)
Dept: CV DIAGNOSTICS | Facility: HOSPITAL | Age: 80
Discharge: HOME | End: 2020-02-07
Attending: INTERNAL MEDICINE
Payer: MEDICARE

## 2020-02-07 DIAGNOSIS — I10 HYPERTENSION, ESSENTIAL: ICD-10-CM

## 2020-02-07 DIAGNOSIS — I25.10 CORONARY ATHEROSCLEROSIS OF NATIVE CORONARY ARTERY: ICD-10-CM

## 2020-02-07 DIAGNOSIS — Z95.1 S/P CABG (CORONARY ARTERY BYPASS GRAFT): ICD-10-CM

## 2020-02-07 DIAGNOSIS — I25.5 ISCHEMIC CARDIOMYOPATHY: ICD-10-CM

## 2020-02-07 DIAGNOSIS — E78.5 DYSLIPIDEMIA: ICD-10-CM

## 2020-02-07 LAB — LV EF: NORMAL %

## 2020-02-07 PROCEDURE — 93798 PHYS/QHP OP CAR RHAB W/ECG: CPT

## 2020-02-07 PROCEDURE — 93306 TTE W/DOPPLER COMPLETE: CPT | Performed by: INTERNAL MEDICINE

## 2020-02-10 ENCOUNTER — CARDPULM VISIT (OUTPATIENT)
Dept: CARDIAC REHAB | Facility: HOSPITAL | Age: 80
End: 2020-02-10
Attending: INTERNAL MEDICINE
Payer: MEDICARE

## 2020-02-10 PROCEDURE — 93798 PHYS/QHP OP CAR RHAB W/ECG: CPT

## 2020-02-12 ENCOUNTER — CARDPULM VISIT (OUTPATIENT)
Dept: CARDIAC REHAB | Facility: HOSPITAL | Age: 80
End: 2020-02-12
Attending: INTERNAL MEDICINE
Payer: MEDICARE

## 2020-02-12 PROCEDURE — 93798 PHYS/QHP OP CAR RHAB W/ECG: CPT

## 2020-02-13 ENCOUNTER — E-ADVICE (OUTPATIENT)
Dept: CARDIOLOGY | Age: 80
End: 2020-02-13

## 2020-02-14 ENCOUNTER — CARDPULM VISIT (OUTPATIENT)
Dept: CARDIAC REHAB | Facility: HOSPITAL | Age: 80
End: 2020-02-14
Attending: INTERNAL MEDICINE
Payer: MEDICARE

## 2020-02-14 PROCEDURE — 93798 PHYS/QHP OP CAR RHAB W/ECG: CPT

## 2020-02-17 ENCOUNTER — CARDPULM VISIT (OUTPATIENT)
Dept: CARDIAC REHAB | Facility: HOSPITAL | Age: 80
End: 2020-02-17
Attending: INTERNAL MEDICINE
Payer: MEDICARE

## 2020-02-17 PROCEDURE — 93798 PHYS/QHP OP CAR RHAB W/ECG: CPT

## 2020-02-19 ENCOUNTER — CARDPULM VISIT (OUTPATIENT)
Dept: CARDIAC REHAB | Facility: HOSPITAL | Age: 80
End: 2020-02-19
Attending: INTERNAL MEDICINE
Payer: MEDICARE

## 2020-02-19 PROCEDURE — 93798 PHYS/QHP OP CAR RHAB W/ECG: CPT

## 2020-02-20 ENCOUNTER — DOCUMENTATION (OUTPATIENT)
Dept: CARDIOLOGY | Age: 80
End: 2020-02-20

## 2020-02-21 ENCOUNTER — CARDPULM VISIT (OUTPATIENT)
Dept: CARDIAC REHAB | Facility: HOSPITAL | Age: 80
End: 2020-02-21
Attending: INTERNAL MEDICINE
Payer: MEDICARE

## 2020-02-21 PROCEDURE — 93798 PHYS/QHP OP CAR RHAB W/ECG: CPT

## 2020-02-24 ENCOUNTER — CARDPULM VISIT (OUTPATIENT)
Dept: CARDIAC REHAB | Facility: HOSPITAL | Age: 80
End: 2020-02-24
Attending: INTERNAL MEDICINE
Payer: MEDICARE

## 2020-02-24 ENCOUNTER — RT VISIT (OUTPATIENT)
Dept: RESPIRATORY THERAPY | Facility: HOSPITAL | Age: 80
End: 2020-02-24
Attending: INTERNAL MEDICINE
Payer: MEDICARE

## 2020-02-24 DIAGNOSIS — R06.00 DYSPNEA ON EXERTION: ICD-10-CM

## 2020-02-24 PROCEDURE — 94729 DIFFUSING CAPACITY: CPT

## 2020-02-24 PROCEDURE — 94726 PLETHYSMOGRAPHY LUNG VOLUMES: CPT

## 2020-02-24 PROCEDURE — 94060 EVALUATION OF WHEEZING: CPT

## 2020-02-24 PROCEDURE — 93798 PHYS/QHP OP CAR RHAB W/ECG: CPT

## 2020-02-24 NOTE — PROCEDURES
Findings:  Postbronchodilator FEV1 is 1.87L, 75% predicted. Postbronchodilator FVC is 2.55L, 77% predicted. FEV1/ FVC ratio is 0.73. There is no significant bronchodilator response after   administration of albuterol.    The flow-volume loop suggests a n

## 2020-02-26 ENCOUNTER — CARDPULM VISIT (OUTPATIENT)
Dept: CARDIAC REHAB | Facility: HOSPITAL | Age: 80
End: 2020-02-26
Attending: INTERNAL MEDICINE
Payer: MEDICARE

## 2020-02-26 PROCEDURE — 93798 PHYS/QHP OP CAR RHAB W/ECG: CPT

## 2020-02-28 ENCOUNTER — CARDPULM VISIT (OUTPATIENT)
Dept: CARDIAC REHAB | Facility: HOSPITAL | Age: 80
End: 2020-02-28
Attending: INTERNAL MEDICINE
Payer: MEDICARE

## 2020-02-28 PROCEDURE — 93798 PHYS/QHP OP CAR RHAB W/ECG: CPT

## 2020-03-02 ENCOUNTER — CARDPULM VISIT (OUTPATIENT)
Dept: CARDIAC REHAB | Facility: HOSPITAL | Age: 80
End: 2020-03-02
Attending: INTERNAL MEDICINE
Payer: MEDICARE

## 2020-03-02 ENCOUNTER — HOSPITAL ENCOUNTER (OUTPATIENT)
Dept: CT IMAGING | Facility: HOSPITAL | Age: 80
Discharge: HOME | End: 2020-03-02
Attending: INTERNAL MEDICINE
Payer: MEDICARE

## 2020-03-02 DIAGNOSIS — R59.1 LYMPHADENOPATHY: ICD-10-CM

## 2020-03-02 LAB — CREAT BLD-MCNC: 1 MG/DL (ref 0.7–1.3)

## 2020-03-02 PROCEDURE — 82565 ASSAY OF CREATININE: CPT

## 2020-03-02 PROCEDURE — 93798 PHYS/QHP OP CAR RHAB W/ECG: CPT

## 2020-03-02 PROCEDURE — 71260 CT THORAX DX C+: CPT | Performed by: INTERNAL MEDICINE

## 2020-03-04 ENCOUNTER — CARDPULM VISIT (OUTPATIENT)
Dept: CARDIAC REHAB | Facility: HOSPITAL | Age: 80
End: 2020-03-04
Attending: INTERNAL MEDICINE
Payer: MEDICARE

## 2020-03-04 PROCEDURE — 93798 PHYS/QHP OP CAR RHAB W/ECG: CPT

## 2020-03-06 ENCOUNTER — CARDPULM VISIT (OUTPATIENT)
Dept: CARDIAC REHAB | Facility: HOSPITAL | Age: 80
End: 2020-03-06
Attending: INTERNAL MEDICINE
Payer: MEDICARE

## 2020-03-06 PROCEDURE — 93798 PHYS/QHP OP CAR RHAB W/ECG: CPT

## 2020-03-09 ENCOUNTER — CARDPULM VISIT (OUTPATIENT)
Dept: CARDIAC REHAB | Facility: HOSPITAL | Age: 80
End: 2020-03-09
Attending: INTERNAL MEDICINE
Payer: MEDICARE

## 2020-03-09 PROCEDURE — 93798 PHYS/QHP OP CAR RHAB W/ECG: CPT

## 2020-03-11 ENCOUNTER — CARDPULM VISIT (OUTPATIENT)
Dept: CARDIAC REHAB | Facility: HOSPITAL | Age: 80
End: 2020-03-11
Attending: INTERNAL MEDICINE
Payer: MEDICARE

## 2020-03-11 PROCEDURE — 93798 PHYS/QHP OP CAR RHAB W/ECG: CPT

## 2020-03-13 ENCOUNTER — APPOINTMENT (OUTPATIENT)
Dept: CARDIAC REHAB | Facility: HOSPITAL | Age: 80
End: 2020-03-13
Attending: INTERNAL MEDICINE
Payer: MEDICARE

## 2020-03-16 ENCOUNTER — APPOINTMENT (OUTPATIENT)
Dept: CARDIAC REHAB | Facility: HOSPITAL | Age: 80
End: 2020-03-16
Attending: INTERNAL MEDICINE
Payer: MEDICARE

## 2020-03-18 ENCOUNTER — APPOINTMENT (OUTPATIENT)
Dept: CARDIAC REHAB | Facility: HOSPITAL | Age: 80
End: 2020-03-18
Attending: INTERNAL MEDICINE
Payer: MEDICARE

## 2020-03-23 ENCOUNTER — APPOINTMENT (OUTPATIENT)
Dept: CARDIAC REHAB | Facility: HOSPITAL | Age: 80
End: 2020-03-23
Attending: INTERNAL MEDICINE
Payer: MEDICARE

## 2020-07-09 ENCOUNTER — E-ADVICE (OUTPATIENT)
Dept: CARDIOLOGY | Age: 80
End: 2020-07-09

## 2020-07-09 ENCOUNTER — TELEPHONE (OUTPATIENT)
Dept: CARDIOLOGY | Age: 80
End: 2020-07-09

## 2020-07-14 ENCOUNTER — CLINICAL ABSTRACT (OUTPATIENT)
Dept: CARDIOLOGY | Age: 80
End: 2020-07-14

## 2020-09-22 ENCOUNTER — TELEPHONE (OUTPATIENT)
Dept: CARDIOLOGY | Age: 80
End: 2020-09-22

## 2020-10-23 RX ORDER — ATORVASTATIN CALCIUM 40 MG/1
40 TABLET, FILM COATED ORAL AT BEDTIME
Qty: 30 TABLET | Refills: 0 | Status: SHIPPED | OUTPATIENT
Start: 2020-10-23 | End: 2021-03-02

## 2020-11-04 NOTE — CONSULTS
[facility] Consult Note    Kirke Kin Patient Status:  Emergency    1940 MRN [id number]   Location [address] [facility] Attending Adriel Allen DO   Hosp Day # 0 PCP PHYSICIAN NONSTAFF     70year old male, consulted for oral meds based on EF    · Risk factors - check A1c, lipids      Herve Jacobs Md.   [facility]    --------------------------------------------------------------------------------------------------------------------------------  ROS 10 systems reviewed   Patient is a 77y old  Male who presents with a chief complaint of change in mental status (04 Nov 2020 14:07)      Reason For Consult: dm2 uncontrolled    HPI:  This is a 77 M with PMH of HTN DM HLD CAD who was BIBEMS to [facility] with complaints of change in mental status and rigors. The son found the patient confused and had urinated on himself. In the ER patient had a temp of 103. CT showed ground glass opacities. Patient was transferred to [place] for admission. In [place], patient is more lucid. He reports feeling weak for about a week. He status he was eating junk food and sweets. He denies fevers, chills, n/v/d/cough/CP/SOB/dysuria. He has been helping his brother move into an assisted living over the past 2 weeks.  (04 Nov 2020 10:38)      PAST MEDICAL & SURGICAL HISTORY:  Diabetes mellitus    Hypertension        FAMILY HISTORY:        Social History:    MEDICATIONS  (STANDING):  aspirin enteric coated 81 milliGRAM(s) Oral daily  atorvastatin 40 milliGRAM(s) Oral at bedtime  azithromycin  IVPB 500 milliGRAM(s) IV Intermittent daily  cefTRIAXone   IVPB 1000 milliGRAM(s) IV Intermittent every 24 hours  clopidogrel Tablet 75 milliGRAM(s) Oral daily  dextrose 5%. 1000 milliLiter(s) (50 mL/Hr) IV Continuous <Continuous>  dextrose 50% Injectable 12.5 Gram(s) IV Push once  dextrose 50% Injectable 25 Gram(s) IV Push once  dextrose 50% Injectable 25 Gram(s) IV Push once  enoxaparin Injectable 73 milliGRAM(s) SubCutaneous daily  insulin lispro (ADMELOG) corrective regimen sliding scale   SubCutaneous three times a day before meals  insulin lispro (ADMELOG) corrective regimen sliding scale   SubCutaneous at bedtime    MEDICATIONS  (PRN):  acetaminophen   Tablet .. 650 milliGRAM(s) Oral every 6 hours PRN Temp greater or equal to 38C (100.4F), Mild Pain (1 - 3)  dextrose 40% Gel 15 Gram(s) Oral once PRN Blood Glucose LESS THAN 70 milliGRAM(s)/deciliter  glucagon  Injectable 1 milliGRAM(s) IntraMuscular once PRN Glucose LESS THAN 70 milligrams/deciliter        T(C): 36.6 (11-04-20 @ 08:59), Max: 39.4 (11-03-20 @ 23:21)  HR: 79 (11-04-20 @ 13:00) (75 - 109)  BP: 126/72 (11-04-20 @ 13:00) (101/53 - 154/69)  RR: 18 (11-04-20 @ 13:00) (18 - 40)  SpO2: 100% (11-04-20 @ 13:00) (93% - 100%)  Wt(kg): --    PHYSICAL EXAM:  GENERAL: NAD, well-groomed, well-developed  HEAD:  Atraumatic, Normocephalic  NECK: Supple, No JVD, Normal thyroid  CHEST/LUNG: Clear to percussion bilaterally; No rales, rhonchi, wheezing, or rubs  HEART: Regular rate and rhythm; No murmurs, rubs, or gallops  ABDOMEN: Soft, Nontender, Nondistended; Bowel sounds present  EXTREMITIES:  2+ Peripheral Pulses, No clubbing, cyanosis, or edema  SKIN: No rashes or lesions    CAPILLARY BLOOD GLUCOSE      POCT Blood Glucose.: 196 mg/dL (04 Nov 2020 12:58)  POCT Blood Glucose.: 383 mg/dL (04 Nov 2020 09:33)  POCT Blood Glucose.: 417 mg/dL (04 Nov 2020 02:51)  POCT Blood Glucose.: 433 mg/dL (03 Nov 2020 23:20)                            10.6   19.39 )-----------( 187      ( 04 Nov 2020 08:25 )             30.5       CMP:  11-04 @ 08:25  SGPT 27  Albumin 2.9   Alk Phos 73   Anion Gap 7   SGOT 19   Total Bili 0.4   BUN 29   Calcium Total 7.9   CO2 23   Chloride 110   Creatinine 1.90   eGFR if AA 39   eGFR if non AA 34   Glucose 320   Potassium 4.4   Protein 5.9   Sodium 140      Thyroid Function Tests:  11-04 @ 08:45 TSH 0.64 FreeT4 -- T3 -- Anti TPO -- Anti Thyroglobulin Ab -- TSI --      Diabetes Tests:       Radiology:

## 2020-11-17 ENCOUNTER — OFFICE VISIT (OUTPATIENT)
Dept: CARDIOLOGY | Age: 80
End: 2020-11-17

## 2020-11-17 VITALS
SYSTOLIC BLOOD PRESSURE: 130 MMHG | WEIGHT: 190 LBS | HEIGHT: 67 IN | DIASTOLIC BLOOD PRESSURE: 70 MMHG | BODY MASS INDEX: 29.82 KG/M2 | HEART RATE: 60 BPM

## 2020-11-17 DIAGNOSIS — Z95.1 S/P CABG (CORONARY ARTERY BYPASS GRAFT): ICD-10-CM

## 2020-11-17 DIAGNOSIS — I25.5 ISCHEMIC CARDIOMYOPATHY: ICD-10-CM

## 2020-11-17 DIAGNOSIS — E78.5 HYPERLIPOPROTEINEMIA: ICD-10-CM

## 2020-11-17 DIAGNOSIS — I10 ESSENTIAL HYPERTENSION: ICD-10-CM

## 2020-11-17 DIAGNOSIS — I25.10 CORONARY ARTERY DISEASE INVOLVING NATIVE CORONARY ARTERY OF NATIVE HEART WITHOUT ANGINA PECTORIS: Primary | ICD-10-CM

## 2020-11-17 DIAGNOSIS — E78.5 DYSLIPIDEMIA: ICD-10-CM

## 2020-11-17 PROBLEM — R73.9 HYPERGLYCEMIA: Status: ACTIVE | Noted: 2020-11-17

## 2020-11-17 PROBLEM — E78.1 HYPERTRIGLYCERIDEMIA: Status: ACTIVE | Noted: 2020-11-17

## 2020-11-17 PROCEDURE — 3078F DIAST BP <80 MM HG: CPT | Performed by: INTERNAL MEDICINE

## 2020-11-17 PROCEDURE — 99215 OFFICE O/P EST HI 40 MIN: CPT | Performed by: INTERNAL MEDICINE

## 2020-11-17 PROCEDURE — 3075F SYST BP GE 130 - 139MM HG: CPT | Performed by: INTERNAL MEDICINE

## 2020-11-17 RX ORDER — ASPIRIN 325 MG
325 TABLET ORAL DAILY
Qty: 30 TABLET | Refills: 11 | Status: SHIPPED | OUTPATIENT
Start: 2020-11-17 | End: 2021-03-02

## 2020-11-17 RX ORDER — CHLORAL HYDRATE 500 MG
1000 CAPSULE ORAL DAILY
COMMUNITY

## 2020-11-17 ASSESSMENT — PATIENT HEALTH QUESTIONNAIRE - PHQ9
CLINICAL INTERPRETATION OF PHQ9 SCORE: NO FURTHER SCREENING NEEDED
SUM OF ALL RESPONSES TO PHQ9 QUESTIONS 1 AND 2: 0
1. LITTLE INTEREST OR PLEASURE IN DOING THINGS: NOT AT ALL
2. FEELING DOWN, DEPRESSED OR HOPELESS: NOT AT ALL
CLINICAL INTERPRETATION OF PHQ2 SCORE: NO FURTHER SCREENING NEEDED
SUM OF ALL RESPONSES TO PHQ9 QUESTIONS 1 AND 2: 0

## 2021-03-02 RX ORDER — ATORVASTATIN CALCIUM 40 MG/1
TABLET, FILM COATED ORAL
Qty: 90 TABLET | Refills: 3 | Status: SHIPPED | OUTPATIENT
Start: 2021-03-02

## 2021-03-02 RX ORDER — ASPIRIN 325 MG/1
TABLET, COATED ORAL
Qty: 90 TABLET | Refills: 3 | Status: SHIPPED | OUTPATIENT
Start: 2021-03-02

## 2021-03-13 ENCOUNTER — HOSPITAL ENCOUNTER (OUTPATIENT)
Dept: CT IMAGING | Facility: HOSPITAL | Age: 81
Discharge: HOME | End: 2021-03-13
Attending: INTERNAL MEDICINE
Payer: MEDICARE

## 2021-03-13 DIAGNOSIS — R59.1 LA (LYMPHADENOPATHY): ICD-10-CM

## 2021-03-13 PROCEDURE — 71250 CT THORAX DX C-: CPT | Performed by: INTERNAL MEDICINE

## 2021-04-26 ENCOUNTER — HOSPITAL ENCOUNTER (OUTPATIENT)
Dept: LAB | Facility: HOSPITAL | Age: 81
Discharge: HOME | End: 2021-04-26
Attending: INTERNAL MEDICINE
Payer: MEDICARE

## 2021-04-26 PROCEDURE — 83036 HEMOGLOBIN GLYCOSYLATED A1C: CPT | Performed by: INTERNAL MEDICINE

## 2021-04-26 PROCEDURE — 80053 COMPREHEN METABOLIC PANEL: CPT | Performed by: INTERNAL MEDICINE

## 2021-04-26 PROCEDURE — 80061 LIPID PANEL: CPT | Performed by: INTERNAL MEDICINE

## 2021-04-26 PROCEDURE — 36415 COLL VENOUS BLD VENIPUNCTURE: CPT | Performed by: INTERNAL MEDICINE

## 2021-04-29 ENCOUNTER — E-ADVICE (OUTPATIENT)
Dept: CARDIOLOGY | Age: 81
End: 2021-04-29

## 2021-05-25 ENCOUNTER — OFFICE VISIT (OUTPATIENT)
Dept: CARDIOLOGY | Age: 81
End: 2021-05-25

## 2021-05-25 VITALS
HEIGHT: 66 IN | BODY MASS INDEX: 31.16 KG/M2 | SYSTOLIC BLOOD PRESSURE: 120 MMHG | WEIGHT: 193.9 LBS | DIASTOLIC BLOOD PRESSURE: 64 MMHG | HEART RATE: 70 BPM

## 2021-05-25 DIAGNOSIS — I10 ESSENTIAL HYPERTENSION: ICD-10-CM

## 2021-05-25 DIAGNOSIS — Z95.1 S/P CABG (CORONARY ARTERY BYPASS GRAFT): ICD-10-CM

## 2021-05-25 DIAGNOSIS — I25.10 CORONARY ARTERY DISEASE INVOLVING NATIVE CORONARY ARTERY OF NATIVE HEART WITHOUT ANGINA PECTORIS: Primary | ICD-10-CM

## 2021-05-25 DIAGNOSIS — J44.9 COPD WITHOUT EXACERBATION (CMD): ICD-10-CM

## 2021-05-25 DIAGNOSIS — R73.9 HYPERGLYCEMIA: ICD-10-CM

## 2021-05-25 DIAGNOSIS — E78.5 DYSLIPIDEMIA: ICD-10-CM

## 2021-05-25 DIAGNOSIS — I25.5 ISCHEMIC CARDIOMYOPATHY: ICD-10-CM

## 2021-05-25 DIAGNOSIS — E78.1 HYPERTRIGLYCERIDEMIA: ICD-10-CM

## 2021-05-25 PROCEDURE — 99215 OFFICE O/P EST HI 40 MIN: CPT | Performed by: INTERNAL MEDICINE

## 2021-05-25 PROCEDURE — 3078F DIAST BP <80 MM HG: CPT | Performed by: INTERNAL MEDICINE

## 2021-05-25 PROCEDURE — 3074F SYST BP LT 130 MM HG: CPT | Performed by: INTERNAL MEDICINE

## 2021-05-25 RX ORDER — CETIRIZINE HYDROCHLORIDE 10 MG/1
10 TABLET ORAL DAILY
COMMUNITY

## 2021-05-25 RX ORDER — LOSARTAN POTASSIUM 25 MG/1
12.5 TABLET ORAL DAILY
Qty: 45 TABLET | Refills: 3 | Status: SHIPPED | OUTPATIENT
Start: 2021-05-25

## 2021-05-25 ASSESSMENT — PATIENT HEALTH QUESTIONNAIRE - PHQ9
SUM OF ALL RESPONSES TO PHQ9 QUESTIONS 1 AND 2: 0
CLINICAL INTERPRETATION OF PHQ2 SCORE: NO FURTHER SCREENING NEEDED
CLINICAL INTERPRETATION OF PHQ9 SCORE: NO FURTHER SCREENING NEEDED
SUM OF ALL RESPONSES TO PHQ9 QUESTIONS 1 AND 2: 0
1. LITTLE INTEREST OR PLEASURE IN DOING THINGS: NOT AT ALL
2. FEELING DOWN, DEPRESSED OR HOPELESS: NOT AT ALL

## 2021-10-11 ENCOUNTER — LAB ENCOUNTER (OUTPATIENT)
Dept: LAB | Facility: HOSPITAL | Age: 81
End: 2021-10-11
Attending: INTERNAL MEDICINE
Payer: MEDICARE

## 2021-10-11 DIAGNOSIS — I10 ESSENTIAL HYPERTENSION: ICD-10-CM

## 2021-10-11 DIAGNOSIS — Z95.1 S/P CABG (CORONARY ARTERY BYPASS GRAFT): ICD-10-CM

## 2021-10-11 DIAGNOSIS — E78.5 HYPERLIPOPROTEINEMIA: ICD-10-CM

## 2021-10-11 DIAGNOSIS — I25.5 ISCHEMIC CARDIOMYOPATHY: ICD-10-CM

## 2021-10-11 DIAGNOSIS — I25.10 CORONARY ATHEROSCLEROSIS OF NATIVE CORONARY ARTERY: Primary | ICD-10-CM

## 2021-10-11 DIAGNOSIS — E78.5 DYSLIPIDEMIA: ICD-10-CM

## 2021-10-11 PROCEDURE — 36415 COLL VENOUS BLD VENIPUNCTURE: CPT

## 2021-10-11 PROCEDURE — 80053 COMPREHEN METABOLIC PANEL: CPT

## 2021-10-11 PROCEDURE — 80061 LIPID PANEL: CPT

## 2021-10-11 PROCEDURE — 83036 HEMOGLOBIN GLYCOSYLATED A1C: CPT

## 2024-04-24 ENCOUNTER — WALK IN (OUTPATIENT)
Dept: URGENT CARE | Age: 84
End: 2024-04-24

## 2024-04-24 VITALS
RESPIRATION RATE: 16 BRPM | DIASTOLIC BLOOD PRESSURE: 64 MMHG | SYSTOLIC BLOOD PRESSURE: 126 MMHG | HEART RATE: 56 BPM | OXYGEN SATURATION: 95 % | TEMPERATURE: 97.6 F

## 2024-04-24 DIAGNOSIS — H61.22 IMPACTED CERUMEN OF LEFT EAR: ICD-10-CM

## 2024-04-24 DIAGNOSIS — Z91.09 ENVIRONMENTAL ALLERGIES: Primary | ICD-10-CM

## 2025-03-16 ENCOUNTER — APPOINTMENT (OUTPATIENT)
Dept: GENERAL RADIOLOGY | Facility: HOSPITAL | Age: 85
End: 2025-03-16
Attending: EMERGENCY MEDICINE
Payer: MEDICARE

## 2025-03-16 ENCOUNTER — HOSPITAL ENCOUNTER (EMERGENCY)
Facility: HOSPITAL | Age: 85
Discharge: HOME | End: 2025-03-16
Attending: EMERGENCY MEDICINE
Payer: MEDICARE

## 2025-03-16 VITALS
RESPIRATION RATE: 22 BRPM | BODY MASS INDEX: 27 KG/M2 | WEIGHT: 162.06 LBS | SYSTOLIC BLOOD PRESSURE: 109 MMHG | OXYGEN SATURATION: 95 % | HEART RATE: 51 BPM | DIASTOLIC BLOOD PRESSURE: 67 MMHG | TEMPERATURE: 98 F

## 2025-03-16 DIAGNOSIS — K43.9 VENTRAL HERNIA WITHOUT OBSTRUCTION OR GANGRENE: ICD-10-CM

## 2025-03-16 DIAGNOSIS — R06.00 DYSPNEA, UNSPECIFIED TYPE: Primary | ICD-10-CM

## 2025-03-16 DIAGNOSIS — R07.89 CHEST PAIN, ATYPICAL: ICD-10-CM

## 2025-03-16 LAB
ALBUMIN SERPL-MCNC: 4.5 G/DL (ref 3.2–4.8)
ALBUMIN/GLOB SERPL: 1.4 {RATIO} (ref 1–2)
ALP LIVER SERPL-CCNC: 80 U/L
ALT SERPL-CCNC: 29 U/L
ANION GAP SERPL CALC-SCNC: 8 MMOL/L (ref 0–18)
AST SERPL-CCNC: 42 U/L (ref ?–34)
BASOPHILS # BLD AUTO: 0.1 X10(3) UL (ref 0–0.2)
BASOPHILS NFR BLD AUTO: 1.2 %
BILIRUB SERPL-MCNC: 0.4 MG/DL (ref 0.2–1.1)
BUN BLD-MCNC: 17 MG/DL (ref 9–23)
CALCIUM BLD-MCNC: 9.4 MG/DL (ref 8.7–10.6)
CHLORIDE SERPL-SCNC: 108 MMOL/L (ref 98–112)
CO2 SERPL-SCNC: 27 MMOL/L (ref 21–32)
CREAT BLD-MCNC: 1.28 MG/DL
D DIMER PPP FEU-MCNC: 0.75 UG/ML FEU (ref ?–0.82)
EGFRCR SERPLBLD CKD-EPI 2021: 56 ML/MIN/1.73M2 (ref 60–?)
EOSINOPHIL # BLD AUTO: 0.41 X10(3) UL (ref 0–0.7)
EOSINOPHIL NFR BLD AUTO: 4.8 %
ERYTHROCYTE [DISTWIDTH] IN BLOOD BY AUTOMATED COUNT: 14.1 %
GLOBULIN PLAS-MCNC: 3.3 G/DL (ref 2–3.5)
GLUCOSE BLD-MCNC: 117 MG/DL (ref 70–99)
HCT VFR BLD AUTO: 45.5 %
HGB BLD-MCNC: 15.5 G/DL
IMM GRANULOCYTES # BLD AUTO: 0.04 X10(3) UL (ref 0–1)
IMM GRANULOCYTES NFR BLD: 0.5 %
LYMPHOCYTES # BLD AUTO: 3.62 X10(3) UL (ref 1–4)
LYMPHOCYTES NFR BLD AUTO: 42.2 %
MCH RBC QN AUTO: 32 PG (ref 26–34)
MCHC RBC AUTO-ENTMCNC: 34.1 G/DL (ref 31–37)
MCV RBC AUTO: 93.8 FL
MONOCYTES # BLD AUTO: 0.62 X10(3) UL (ref 0.1–1)
MONOCYTES NFR BLD AUTO: 7.2 %
NEUTROPHILS # BLD AUTO: 3.79 X10 (3) UL (ref 1.5–7.7)
NEUTROPHILS # BLD AUTO: 3.79 X10(3) UL (ref 1.5–7.7)
NEUTROPHILS NFR BLD AUTO: 44.1 %
NT-PROBNP SERPL-MCNC: 638 PG/ML (ref ?–450)
OSMOLALITY SERPL CALC.SUM OF ELEC: 299 MOSM/KG (ref 275–295)
PLATELET # BLD AUTO: 231 10(3)UL (ref 150–450)
POTASSIUM SERPL-SCNC: 5 MMOL/L (ref 3.5–5.1)
PROT SERPL-MCNC: 7.8 G/DL (ref 5.7–8.2)
RBC # BLD AUTO: 4.85 X10(6)UL
SODIUM SERPL-SCNC: 143 MMOL/L (ref 136–145)
TROPONIN I SERPL HS-MCNC: 8 NG/L
WBC # BLD AUTO: 8.6 X10(3) UL (ref 4–11)

## 2025-03-16 PROCEDURE — 83880 ASSAY OF NATRIURETIC PEPTIDE: CPT | Performed by: EMERGENCY MEDICINE

## 2025-03-16 PROCEDURE — 93005 ELECTROCARDIOGRAM TRACING: CPT

## 2025-03-16 PROCEDURE — 93010 ELECTROCARDIOGRAM REPORT: CPT

## 2025-03-16 PROCEDURE — 99285 EMERGENCY DEPT VISIT HI MDM: CPT

## 2025-03-16 PROCEDURE — 80053 COMPREHEN METABOLIC PANEL: CPT | Performed by: EMERGENCY MEDICINE

## 2025-03-16 PROCEDURE — 99284 EMERGENCY DEPT VISIT MOD MDM: CPT

## 2025-03-16 PROCEDURE — 71045 X-RAY EXAM CHEST 1 VIEW: CPT | Performed by: EMERGENCY MEDICINE

## 2025-03-16 PROCEDURE — 84484 ASSAY OF TROPONIN QUANT: CPT | Performed by: EMERGENCY MEDICINE

## 2025-03-16 PROCEDURE — 36415 COLL VENOUS BLD VENIPUNCTURE: CPT

## 2025-03-16 PROCEDURE — 85025 COMPLETE CBC W/AUTO DIFF WBC: CPT | Performed by: EMERGENCY MEDICINE

## 2025-03-16 PROCEDURE — 85379 FIBRIN DEGRADATION QUANT: CPT | Performed by: EMERGENCY MEDICINE

## 2025-03-16 NOTE — ED INITIAL ASSESSMENT (HPI)
A&Ox3 ambulatory patient bib family for c/o central CP and SOB/RAYGOZA since 2/9    Patient had recent travel to [place], returned yesterday  Reports had w/u done while in [place] and was Dc'd w/ prescription for ASA    Patient endorses +RLE swelling denies pain  +nausea  +dizziness    Also endorses \"bulging ball\" on RLQ    RR even/NL, speaking in full clear sentences

## 2025-03-16 NOTE — ED PROVIDER NOTES
Patient Seen in: [facility] Emergency Department      History     Chief Complaint   Patient presents with    Difficulty Breathing    Chest Pain Angina     Stated Complaint: many complaints, marycruz, ball in stomach, losing strength in right side of body si*    Subjective:   84-year-old male, history of CABG, hypertension hyperlipidemia on aspirin, presents with complaints ongoing for about 6 weeks.  States has been having some intermittent shortness of breath and dyspnea on exertion, leg swelling, also a hernia in his right lower quadrant.  His hernias been there for about 5 years, wonder if it is causing the shortness of breath.  His legs are intermittently swollen.  Came by for [email or web address] yesterday.  States no acute symptoms today, but is been on [place] since October, saw  Last month, was given aspirin and follow-up and told to come back here to see his cardiovascular team when he returns.  Nothing new today.  No fevers.  Intermittent chronic cough.  No pleurisy or hemoptysis.              Objective:     Past Medical History:    High blood pressure    High cholesterol              Past Surgical History:   Procedure Laterality Date    Bypass surgery  2019    Prostate biopsies                  Social History     Socioeconomic History    Marital status:    Tobacco Use    Smoking status: Former     Current packs/day: 0.00     Average packs/day: 1 pack/day for 50.0 years (50.0 ttl pk-yrs)     Types: Cigarettes     Start date: 1969     Quit date: 2019     Years since quittin.3    Smokeless tobacco: Never    Tobacco comments:     Using Nicotine patch   Vaping Use    Vaping status: Never Used   Substance and Sexual Activity    Alcohol use: Not Currently     Comment: Not currently drinking any alcohol since surg.    Drug use: Never     Social Drivers of Health     Food Insecurity: Not on File (2024)    Received from [facility]    Food Insecurity     Food: 0   Transportation Needs: Not on File  (10/7/2022)    Received from [employer]    Transportation Needs     Transportation: 0   Housing Stability: Not on File (10/7/2022)    Received from [employer]    Housing Stability     Housin                  Physical Exam     ED Triage Vitals   BP 25 1355 148/72   Pulse 25 1355 57   Resp 25 1355 22   Temp 25 1355 97.7 °F (36.5 °C)   Temp src --    SpO2 25 1355 94 %   O2 Device 25 1415 None (Room air)       Current Vitals:   Vital Signs  BP: 109/67  Pulse: 51  Resp: 22  Temp: 97.7 °F (36.5 °C)  MAP (mmHg): 80    Oxygen Therapy  SpO2: 95 %  O2 Device: None (Room air)        Physical Exam  Vitals and nursing note reviewed.   Constitutional:       General: He is not in acute distress.     Appearance: He is well-developed. He is not ill-appearing, toxic-appearing or diaphoretic.   HENT:      Head: Normocephalic.   Cardiovascular:      Rate and Rhythm: Normal rate and regular rhythm.   Pulmonary:      Effort: Pulmonary effort is normal.      Breath sounds: Examination of the left-lower field reveals decreased breath sounds. Decreased breath sounds present.   Chest:      Chest wall: No tenderness.   Musculoskeletal:      Cervical back: Normal range of motion and neck supple.      Right lower leg: No tenderness. No edema.      Left lower leg: No tenderness. No edema.   Skin:     General: Skin is warm and dry.   Neurological:      General: No focal deficit present.      Mental Status: He is alert.   Psychiatric:         Mood and Affect: Mood normal.         Behavior: Behavior normal.             ED Course     Labs Reviewed   COMP METABOLIC PANEL (14) - Abnormal; Notable for the following components:       Result Value    Glucose 117 (*)     Calculated Osmolality 299 (*)     eGFR-Cr 56 (*)     AST 42 (*)     All other components within normal limits   PRO BETA NATRIURETIC PEPTIDE - Abnormal; Notable for the following components:    Pro-Beta Natriuretic Peptide 638 (*)     All other components  within normal limits   TROPONIN I HIGH SENSITIVITY - Normal   D-DIMER - Normal   CBC WITH DIFFERENTIAL WITH PLATELET   RAINBOW DRAW BLUE     EKG    Rate, intervals and axes as noted on EKG Report.  Rate: 53  Rhythm: Sinus Rhythm  Reading: EKG sinus bradycardia 53 bpm.  No ST elevations.  , ,  ms.  When compared to November 2019, some mild nonspecific changes noted, nothing significant that I can see    Patient placed on cardiac monitor for telemetry monitoring secondary to RAYGOZA. Interpretation at bedside by me is sinus bradycardia.                         MDM      XR CHEST AP PORTABLE  (CPT=71045)    Result Date: 3/16/2025  CONCLUSION:  Shallow inspiration.  Scattered calcified granulomata throughout both lungs.  Heart size and pulmonary vascularity upper limits of normal.  Slight atelectasis left lung base.   LOCATION:  [facility]      Dictated by (CST): Nery Snow MD on 3/16/2025 at 3:02 PM     Finalized by (CST): Nery Snow MD on 3/16/2025 at 3:02 PM        I independent interpretation of the chest without any obvious signs of acute infiltrate    Family at bedside able to provide information on the history presenting illness    External chart review demonstrates outpatient visits and inpatient visits remotely for his cardiac issues, last PCP visit was last April that I can see    Differential diagnosis includes, but not limited to, ACS, PE, CHF, viral syndrome, chronic deconditioning    84-year-old male presents with some dyspnea on exertion, intermittent orthopnea, intermittent peripheral edema while he has been in [place] for the last several months.  Came back yesterday, here for evaluation.  Family states that he spoke with cardiologist and her call tomorrow for an appointment.  I also spoke with [facility], they will call tomorrow for an outpatient appointment.  Troponin is negative.  EKG is stable from previous.  Dimer is negative.  Labs are overall stable so his imaging.  His vital signs are  stable.  He is resting in no distress whatsoever.  This is a very small ventral hernia right of the midline and periumbilical region which is been there for at least 5 years he says.  Again outpatient surgical follow-up is needed but showing no signs of incarceration or strangulation causing no problems.  Does appear to be a bit anxious at baseline.  Regarding his chronic medical conditions as his family.  He was  for all communication.  Happy with the plan.  Further eval for considered and discussed.  Discussed [facility], they will call in the morning.  Shared decision making utilized, discharged home, discussed risk, benefits, alternatives with the     Patient was screened and evaluated during this visit.  As the treating physician attending to the patient, I determined within reasonable clinical confidence and prior to discharge, that an emergency medical condition was not or was no longer present.  There was no indication for further evaluation, treatment, or admission on an emergency basis.  Comprehensive verbal and written discharge and follow-up instructions were provided to help prevent relapse or worsening.  Patient was instructed to follow-up with their primary care provider for further evaluation and treatment, return immediately to ER for worsening, concerning, new, or changing/persisting symptoms. I discussed the case with the patient and they had no questions, complaints, or concerns.  Patient was comfortable going home.     Per the discharge paperwork, patients are encouraged to and given instructions on how to [facility], where they have access to their records, including any/all incidental findings.     This note was prepared using Dragon Medical voice recognition dictation software. As a result errors may occur. When identified these errors have been corrected. While every attempt is made to correct errors during dictation discrepancies may still exist    Note to  patient: The 21st Century Cures Act makes medical notes like these available to patients in the interest of transparency. However, this is a medical document intended as peer to peer communication. It is written in medical language and may contain abbreviations or verbiage that are unfamiliar. It may appear blunt or direct. Medical documents are intended to carry relevant information, facts as evident, and the clinical opinion of the practitioner.             Medical Decision Making      Disposition and Plan     Clinical Impression:  1. Dyspnea, unspecified type    2. Chest pain, atypical    3. Ventral hernia without obstruction or gangrene         Disposition:  Discharge  3/16/2025  4:56 pm    Follow-up:  [facility]  [address]  [facility] [address]  [phone]  Follow up  As needed    Devonte Mccarthy MD  [address] [facility] [address]  [phone]    Follow up      Angelica Romero MD  [address] [facility] [address]  [phone]    Follow up  As needed          Medications Prescribed:  Discharge Medication List as of 3/16/2025  4:57 PM              Supplementary Documentation:

## 2025-03-18 LAB
ATRIAL RATE: 53 BPM
P AXIS: 49 DEGREES
P-R INTERVAL: 194 MS
Q-T INTERVAL: 452 MS
QRS DURATION: 100 MS
QTC CALCULATION (BEZET): 424 MS
R AXIS: -7 DEGREES
T AXIS: 100 DEGREES
VENTRICULAR RATE: 53 BPM

## 2025-03-26 ENCOUNTER — ORDER TRANSCRIPTION (OUTPATIENT)
Dept: SLEEP CENTER | Age: 85
End: 2025-03-26

## 2025-03-26 DIAGNOSIS — R06.83 SNORING: ICD-10-CM

## 2025-03-26 DIAGNOSIS — R35.1 NOCTURIA: Primary | ICD-10-CM

## 2025-03-26 DIAGNOSIS — G47.10 HYPERSOMNIA: ICD-10-CM

## 2025-03-26 DIAGNOSIS — E66.9 OBESITY (BMI 30-39.9): ICD-10-CM

## 2025-03-31 ENCOUNTER — TELEPHONE (OUTPATIENT)
Dept: SLEEP CENTER | Age: 85
End: 2025-03-31

## 2025-04-07 ENCOUNTER — TELEPHONE (OUTPATIENT)
Dept: SLEEP CENTER | Age: 85
End: 2025-04-07

## (undated) DEVICE — SOL  .9 1000ML BTL

## (undated) DEVICE — CELL SAVER RESERVOIR BRAT

## (undated) DEVICE — PDS II VLT 0 107CM AG ST3: Brand: ENDOLOOP

## (undated) DEVICE — OPEN HEART: Brand: MEDLINE INDUSTRIES, INC.

## (undated) DEVICE — BLOWER CO2 MEDTRONIC/DLP 22150

## (undated) DEVICE — SYRINGE 30ML LL TIP

## (undated) DEVICE — GAMMEX® PI HYBRID SIZE 5.5, STERILE POWDER-FREE SURGICAL GLOVE, POLYISOPRENE AND NEOPRENE BLEND: Brand: GAMMEX

## (undated) DEVICE — CELL SAVER 5/5+ BOWL KIT-225ML: Brand: HAEMONETICS CELL SAVER 5/5+ SYSTEMS

## (undated) DEVICE — GLOVE SURG TRIUMPH SZ 8

## (undated) DEVICE — SUTURE POLYDEK 2-0

## (undated) DEVICE — STERILE POLYISOPRENE POWDER-FREE SURGICAL GLOVES: Brand: PROTEXIS

## (undated) DEVICE — SUTURE WIRE DOUBLE STERNOTOMY

## (undated) DEVICE — SUTURE PROLENE 7-0 CC

## (undated) DEVICE — HEMOCLIP HORIZON LG 004200

## (undated) DEVICE — INTENDED TO BE USED TO OCCLUDE, RETRACT AND IDENTIFY ARTERIES, VEINS, TENDONS AND NERVES IN SURGICAL PROCEDURES: Brand: STERION®  VESSEL LOOP

## (undated) DEVICE — SUTURE SILK 2-0

## (undated) DEVICE — TRAY ENDOVEIN KTV16 HARVESTING

## (undated) DEVICE — Device

## (undated) DEVICE — SOL LACT RINGERS 1000ML

## (undated) DEVICE — CELL SAVER BAG 600ML 4R2023

## (undated) NOTE — LETTER
Crow Hogue M.D., F.A.C.S. Alondra Torres M.D., F.A.C.S. Rossana Grewal M.D., M.D., F.A.C.SAnshul Evans M.D., F.A.C.S. CRISTOPHER Garcia M. Donneta Negus A.D. Pervis Macho, M.D., F. chance to have all of your questions and concerns answered. If there are any issues which have not been adequately addressed, we ask you to bring them forward so that we can thoroughly address them.     A patient who is fully informed and understands their treatment options:    Yes _____ No _____    A [facility] surgeon as explained to me that if I should so desire, he/she is willing to explain my case and the surgical and non-surgical options to family members:             Yes _____ No _____    A [facility] surgeon has an

## (undated) NOTE — LETTER
[facility] [address], [address]    Consentimiento para cirugía    Fecha:11/3/2019  Hora:1000     1. Autorizo la realización en [place] la siguiente cirugía:    Procedure(s): El bypass de la arteria coronaria       2.  [facility] identidad no sea revelada en las imágenes o textos descriptivos que los acompañan. Si el procedimiento se ha [employer], el cirujano obtendrá el video original. El hospital no será responsable del almacenamiento o mantenimiento de esta grabación. para fines de restablecer la orden de No resucitar.     CERTIFICO QUE HE LEÍDO Y ENTENDIDO EL CONSENTIMIENTO PARA CIRUGÍA ANTERIOR, QUE MI MÉDICO ME PROPORCIONÓ LAS EXPLICACIONES ANTERIORES, QUE TODOS LOS ESPACIOS EN COREA, O LAS AFIRMACIONES QUE REQUIEREN b. Christiano Franklin a mi anestesista antes del procedimiento:  · Si estoy embarazada. · La última vez que comí o bebí algo.   · [employer] medicamentos que shaun (incluyendo medicamentos recetados, suplementos herbales y pastillas que puedo comprar sin receta Neches Puarto joniar de opinión acerca de recibir servicios de anestesia en cualquier momento antes de que se me administre el medicamento.      _______________________________________________________       ____________________       _________    Susy lowe repr

## (undated) NOTE — LETTER
[facility] [address], [address]  Consent for Procedure/Sedation    Date: 10/30/19    Time: 11: 45 a.m.      1. I authorize the performance upon Robert Sheppard the following:cardiac catheterization, left ventricular cineangiogr period, the physician will determine when the applicable recovery period ends for purposes of reinstating the Do Not Resuscitate (DNR) order.     Signature of Patient: ____________________________________________________    Signature of person authorized

## (undated) NOTE — LETTER
Lacie Caceres M.D., F.A.C.S. Suzy Clements M.D., F.A.C.S. Sander Dillard M.D., Eleazar Mckeon. ARA Swartz M.D., F.A.C.SAnshul Lee. Fani Almaraz M.D., F.A.C.SCRISTOPHER Salinas M. Joyce Jacobus A.D. Jeannetta Rushing, M.D., CHELSEA chance to have all of your questions and concerns answered. If there are any issues which have not been adequately addressed, we ask you to bring them forward so that we can thoroughly address them.     A patient who is fully informed and understands their treatment, among other options and the risks and benefits of the different treatment options:    Yes _____ No _____    A [facility] surgeon as explained to me that if I should so desire, he/she is willing to explain my case and the surgical and non-surgical optio

## (undated) NOTE — Clinical Note
Your patient was recently seen at the [facility] for a hospital follow-up visit. The visit note is attached. Please contact the clinic with any questions at [phone].     Thank you,  LAKIA Thompson

## (undated) NOTE — LETTER
Faith Andre M.D., F.A.C.S. Triston Manning M.D., F.A.C.SEvangelist Sagastume M.D., M.D., F.A.C.S. [facility]. Zina Tilley M.D., F.A.C.S. CRISTOPHER Trujillo M. Fredricka Caddy A.D. Julien Le, M.D., F. chance to have all of your questions and concerns answered. If there are any issues which have not been adequately addressed, we ask you to bring them forward so that we can thoroughly address them.     A patient who is fully informed and understands their treatment options:    Yes _____ No _____    A [facility] surgeon as explained to me that if I should so desire, he/she is willing to explain my case and the surgical and non-surgical options to family members:             Yes _____ No _____    A CSA surgeon has an

## (undated) NOTE — LETTER
Pauly Glaser M.D., F.A.C.S. Austin Rios M.D., F.A.C.S. Yara Horowitz M.D., Nat Rivera. ARA Saldaña M.D., F.A.C.S. Catracho Chery. Awa Bustillo M.D., F.A.C.S. Buddy Gonzalez M.D. RONNIE Chang M.D., FAnshul chance to have all of your questions and concerns answered. If there are any issues which have not been adequately addressed, we ask you to bring them forward so that we can thoroughly address them.     A patient who is fully informed and understands their treatment options:    Yes _____ No _____    A [facility] surgeon as explained to me that if I should so desire, he/she is willing to explain my case and the surgical and non-surgical options to family members:             Yes _____ No _____    A CSA surgeon has an